# Patient Record
Sex: MALE | Race: WHITE | NOT HISPANIC OR LATINO | Employment: FULL TIME | URBAN - NONMETROPOLITAN AREA
[De-identification: names, ages, dates, MRNs, and addresses within clinical notes are randomized per-mention and may not be internally consistent; named-entity substitution may affect disease eponyms.]

---

## 2017-06-20 ENCOUNTER — HOSPITAL ENCOUNTER (EMERGENCY)
Facility: HOSPITAL | Age: 37
Discharge: HOME | End: 2017-06-20
Attending: EMERGENCY MEDICINE | Admitting: EMERGENCY MEDICINE
Payer: COMMERCIAL

## 2017-06-20 VITALS
WEIGHT: 160 LBS | HEART RATE: 90 BPM | RESPIRATION RATE: 18 BRPM | DIASTOLIC BLOOD PRESSURE: 93 MMHG | TEMPERATURE: 98.7 F | BODY MASS INDEX: 26.63 KG/M2 | SYSTOLIC BLOOD PRESSURE: 136 MMHG
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
## 2017-06-20 PROCEDURE — 99283 EMERGENCY DEPT VISIT LOW MDM: CPT

## 2018-01-10 NOTE — RESULT NOTES
Message   Recorded as Task   Date: 09/23/2016 10:58 AM, Created By: Benita Martinez   Task Name: Call Back   Assigned To: Saira Damian   Regarding Patient: Marzena Arita, Status: In Progress   Comment:    Saira Damian - 23 Sep 2016 10:58 AM     TASK CREATED  Pt called with great amount of pain that has returned, the only avail date before your week out of the office, is 09 27 2016 early am, after your full and so is Syed  Jerryl Rm Jerryl Rm Jerryl Rm I understand that is a consult slot, I wanted to ask you if I can sched pt there  Jerryl Rm Jerryl Rm Jerryl Rm Pt seen Gutierrez Nash for f/u from last proc and was informed if pain return, will need a f/u to discuss repeat inj    Jerryl Rm Jerryl Rm Thanks   Kristen Pichardo - 23 Sep 2016 11:19 AM     TASK REPLIED TO: Previously Assigned To Kristen Pichardo                      he can see me at 11:30 on Tuesday 9/27    thanks [place]! Saira Damian - 23 Sep 2016 2:05 PM     TASK IN PROGRESS   Saira Damian - 23 Sep 2016 2:06 PM     TASK EDITED  Called pt sched f/u on 09 27 2016 @ 11;15am for 11:30am ok'd JE          Signatures   Electronically signed by : Sagar Hodge, ; Sep 23 2016  2:06PM EST                       (Author)

## 2018-01-11 NOTE — RESULT NOTES
Message   Recorded as Task   Date: 06/22/2016 03:48 PM, Created By: Ruddy Figueredo   Task Name: Follow Up   Assigned To: [id number] [place] [address] end procedure,Team   Regarding Patient: Nohemy Biggs, Status: Active   CommentLucia Sayyoli - 22 Jun 2016 3:48 PM     TASK CREATED  S/P LT L5 & LT S1 TFESI on 6/15/16  Mildred Post - 22 Jun 2016 4:29 PM     TASK REASSIGNED: Previously Assigned To [address] [place] [address] - 23 Jun 2016 4:29 PM     TASK EDITED  1st attempt to call, no answer  LMOM for Sandor Arevalo - 27 Jun 2016 11:55 AM     TASK EDITED  Pt  is S/P LT L5 AND LT S1 TFESI on 6/15/16 w/ Dr Oseas Moran  Pt  reports 90% relief post inj  with a greatly improved ability to perform ADL's  No S/S of infection  Pt  has a F/U with Yvonne Dudley on 6/29/16     Sandeep Farley - 28 Jun 2016 8:46 AM     TASK REPLIED TO: Previously Assigned To Sandeep Farley  aware agree        Signatures   Electronically signed by : Jaleesa Duron, ; Jun 28 2016 10:33AM EST                       (Author)

## 2018-01-11 NOTE — PROGRESS NOTES
Assessment    1  Encounter for preventive health examination (V70 0) (Z00 00)   2  Lumbar radiculopathy (724 4) (M54 16)   3  Back pain, unspecified back pain laterality, unspecified chronicity, unspecified location   (724 5) (M54 9)    Plan  Back pain, unspecified back pain laterality, unspecified chronicity, unspecified location    · Oxycodone-Acetaminophen 5-325 MG Oral Tablet; TAKE 1 TABLET EVERY 4 TO 6  HOURS AS NEEDED FOR PAIN  Back pain, unspecified back pain laterality, unspecified chronicity, unspecified location,  Disc degeneration, lumbar, Lumbar radiculopathy    · PredniSONE 20 MG Oral Tablet; one po tid x 3 days, bid x 3 days, qd x 3 days  Health Maintenance    · * MRI LUMBAR SPINE WO CONTRAST; Status:Need Information - Financial  Authorization; Requested for:05Apr2016;     Discussion/Summary  Impression: health maintenance visit  Currently, he eats a healthy diet and eats an adequate diet  Prostate cancer screening: PSA is not indicated  Testicular cancer screening: the risks and benefits of testicular cancer screening were discussed, self testicular exam technique was taught and monthly self testicular exam was advised  Colorectal cancer screening: colorectal cancer screening is not indicated  The risks and benefits of immunizations were discussed and immunizations are up to date  Advice and education were given regarding nutrition, aerobic exercise, weight bearing exercise, weight loss, calcium supplements, vitamin D supplements, reproductive health, cardiovascular risk reduction, tobacco cessation, alcohol use, sunscreen use, self skin examination, helmet use, seat belt use, fall risk reduction and advanced directive planning  Patient discussion: discussed with the patient  History of Present Illness  HM, Adult Male: The patient is being seen for a health maintenance evaluation  General Health: The patient's health since the last visit is described as good  He has regular dental visits   He denies vision problems  He denies hearing loss  Immunizations status: up to date  Lifestyle:  He consumes a diverse and healthy diet  He does not have any weight concerns  He exercises regularly  He does not use tobacco  He denies alcohol use  He denies drug use  Screening: cancer screening reviewed and current  metabolic screening reviewed and current  risk screening reviewed and current  HPI: Patient has left lower back pain with radiation into the left left and buttock  He was seen in the ED on 3/27 and given Valium and Toradol with minimal improvement  The patient awoke approximately three days prior to the ED visit with severe left lower back pain and radiation into the left leg  HIs symptoms worsened over the next few days prompting his visit to the ED  Lumbar spine x-ray was negative in the ED  The patient notes lower back pain with radiation into the leg and weakness of the left leg  He describes the leg as numb  Review of Systems    Constitutional: No fever or chills, feels well, no tiredness, no recent weight gain or weight loss  Eyes: No complaints of eye pain, no red eyes, no discharge from eyes, no itchy eyes  ENT: no complaints of earache, no hearing loss, no nosebleeds, no nasal discharge, no sore throat, no hoarseness  Cardiovascular: No complaints of slow heart rate, no fast heart rate, no chest pain, no palpitations, no leg claudication, no lower extremity  Respiratory: No complaints of shortness of breath, no wheezing, no cough, no SOB on exertion, no orthopnea or PND  Gastrointestinal: No complaints of abdominal pain, no constipation, no nausea or vomiting, no diarrhea or bloody stools  Genitourinary: No complaints of dysuria, no incontinence, no hesitancy, no nocturia, no genital lesion, no testicular pain  Musculoskeletal: as noted in HPI  Integumentary: No complaints of skin rash or skin lesions, no itching, no skin wound, no dry skin     Neurological: No compliants of headache, no confusion, no convulsions, no numbness or tingling, no dizziness or fainting, no limb weakness, no difficulty walking  Psychiatric: Is not suicidal, no sleep disturbances, no anxiety or depression, no change in personality, no emotional problems  Endocrine: No complaints of proptosis, no hot flashes, no muscle weakness, no erectile dysfunction, no deepening of the voice, no feelings of weakness  Hematologic/Lymphatic: No complaints of swollen glands, no swollen glands in the neck, does not bleed easily, no easy bruising  Active Problems    1  Back pain (724 5) (M54 9)   2  Current every day smoker (305 1) (F17 200)   3  Disc degeneration, lumbar (722 52) (M51 36)   4  Headache (784 0) (R51)    Surgical History    · History of Back Surgery   · History of Hand Surgery    Family History    · Family history of Cancer (199 1) (C80 1)    · Family history of Diabetes (250 00) (E11 9)    Social History    · Current every day smoker (305 1) (F17 200)   · Social alcohol use (Z78 9)    Current Meds   1  No Reported Medications Recorded    Allergies    1  Strawberry    Vitals   Recorded: [id number] 08:02AM Recorded: [id number] 08:00AM   Heart Rate 92    Respiration 16    Systolic  817   Diastolic  84   Height  5 ft 5 in   Weight  169 lb 8 0 oz   BMI Calculated  28 21   BSA Calculated  1 85     Physical Exam    Constitutional   General appearance: No acute distress, well appearing and well nourished  Eyes   Conjunctiva and lids: No swelling, erythema, or discharge  Pupils and irises: Equal, round and reactive to light  Ears, Nose, Mouth, and Throat   External inspection of ears and nose: Normal     Otoscopic examination: Tympanic membrance translucent with normal light reflex  Canals patent without erythema  Oropharynx: Normal with no erythema, edema, exudate or lesions  Pulmonary   Respiratory effort: No increased work of breathing or signs of respiratory distress  Auscultation of lungs: Clear to auscultation  Cardiovascular   Auscultation of heart: Normal rate and rhythm, normal S1 and S2, without murmurs  Examination of extremities for edema and/or varicosities: Normal     Abdomen   Abdomen: Non-tender, no masses  Lymphatic   Palpation of lymph nodes in neck: No lymphadenopathy  Musculoskeletal   Gait and station: Normal     Digits and nails: Normal without clubbing or cyanosis  Inspection/palpation of joints, bones, and muscles: Abnormal   left lower back tenderness with paraspinal tenderness, incision from previous laminectomy noted, + straight leg raise  Skin   Skin and subcutaneous tissue: Normal without rashes or lesions  Neurologic   Cranial nerves: Cranial nerves 2-12 intact  Reflexes: Abnormal   Deep tendon reflexes: 3+ right patella, 1+ left patella, 3+ right ankle jerk and 1+ left ankle jerk  Sensation: No sensory loss  Psychiatric   Orientation to person, place and time: Normal     Mood and affect: Normal        Procedure    Procedure: Hearing Acuity Test    Audiometry:   The patient Tolerated the procedure well  There were no complications  Procedure: Visual Acuity Test    Results: normal in both eyes  The patient tolerated the procedure well  There were no complications  Signatures   Electronically signed by : TEMO Elliott; Apr 5 2016  8:24AM EST                       (Author)    Electronically signed by : Zachery Milton DO;  Apr 5 2016  9:17AM EST                       (Author)

## 2018-01-12 NOTE — RESULT NOTES
Message   Discussed results with patient  Will refer to neurosurgery to discuss discectomy  Will add NSAID  Verified Results  * MRI LUMBAR SPINE WO CONTRAST [id number] 03:08PM Jimbo Cheema Order Number: [id number]     Test Name Result Flag Reference   MRI LUMBAR SPINE WO CONTRAST (Report)     MRI LUMBAR SPINE WITHOUT CONTRAST     INDICATION: Low back pain radiating down left leg, left leg numbness, 3 weeks     COMPARISON: X-ray 3/27/2016     TECHNIQUE: Sagittal T1, sagittal T2, sagittal inversion recovery, axial T1 and axial T2, coronal T2       IMAGE QUALITY: Diagnostic     FINDINGS:     ALIGNMENT: Left convex L3-4 apex scoliosis  Minor straightening of normal lumbar lordosis  Minor degree of congenital canal stenosis  MARROW SIGNAL: Normal marrow signal is identified within the visualized bony structures  No discrete marrow lesion  DISTAL CORD AND CONUS: Normal size and signal within the distal cord and conus  The conus ends at the L1 level  PARASPINAL SOFT TISSUES: Paraspinal soft tissues are unremarkable  SACRUM: Normal signal within the sacrum  No evidence of insufficiency or stress fracture  LOWER THORACIC DISC SPACES: Normal disc height and signal  No disc herniation, canal stenosis or foraminal narrowing  LUMBAR DISC SPACES:        L1-L2: Normal      L2-L3: Normal      L3-L4: Normal      L4-L5: Slight reduction disc height, thin broad-based posterior protrusion  Narrowing of the left lateral recess, correlate for left L5 radiculitis  L5-S1: Broad-based central disc protrusion in contact with both S1 roots  Left S1 root appears slightly displaced  Correlate for left S1 radiculitis  IMPRESSION:     Narrowing of the left lateral recess L4-L5, correlate for left L5 radiculitis  Broad-based central protrusion L5-S1, displacing left S1 root  Correlate for left S1 radiculitis  Workstation performed: [id number]     Signed by:    Swati Ceron MD   4/12/16 Plan  Back pain, unspecified back pain laterality, unspecified chronicity, unspecified location    · Oxycodone-Acetaminophen 5-325 MG Oral Tablet; TAKE 1 TABLET EVERY 4 TO  6 HOURS AS NEEDED FOR PAIN  Lumbar disc herniation with radiculopathy, Lumbar radiculopathy    · 1 - Giacomo Carrillo MD  (Neurosurgery) Physician Referral  Consult  Status: Active   Requested for: 12Apr2016  Care Summary provided  : Yes

## 2018-01-15 NOTE — RESULT NOTES
Message   Recorded as Task   Date: 06/08/2016 09:01 AM, Created By: Barbara Zuniga   Task Name: Follow Up   Assigned To: [id number] end procedure,Team   Regarding Patient: Marilu Huitron, Status: In Progress   KelsiOdalys Ventura - 08 Jun 2016 9:01 AM     TASK CREATED  Pt  is S/P LT L5 & LT S1 TFESI performed on 6/01/16 by Dr Lauryn Rangel  Pt  is scheduled for F/U on 6/29/16 with Sandor Jurado - 08 Jun 2016 9:02 AM     TASK EDITED  1st attempt to call, no answer  Left message for C/B  Mildred Post - 09 Jun 2016 1:42 PM     TASK EDITED    Pt LM on [place] procedure line 6/8/16a t 1534, returning our call, will be home till 10 pm, till he goes into work for night shift  *************   Sandor Mckeon - 16 Jun 2016 10:48 AM     TASK EDITED  Pt  reports 50% improvement post inj  and an increase in ability to perform ADL's  Pt  reports no sS/S of infection  Pt has F/U on 6/29/16 with Sun Zuniga - 16 Jun 2016 10:48 AM     TASK EDITED   Nishant Rodriguez - 16 Jun 2016 11:32 AM     TASK REPLIED TO: Previously Assigned To Erik Coker Mary - 16 Jun 2016 4:01 PM     TASK IN PROGRESS        Signatures   Electronically signed by :  Chinyere Mao, ; Jun 16 2016  4:01PM EST                       (Author)

## 2018-04-11 ENCOUNTER — TELEPHONE (OUTPATIENT)
Dept: PAIN MEDICINE | Facility: CLINIC | Age: 38
End: 2018-04-11

## 2018-06-24 ENCOUNTER — HOSPITAL ENCOUNTER (EMERGENCY)
Facility: HOSPITAL | Age: 38
Discharge: HOME | End: 2018-06-24
Attending: EMERGENCY MEDICINE | Admitting: EMERGENCY MEDICINE
Payer: COMMERCIAL

## 2018-06-24 ENCOUNTER — APPOINTMENT (EMERGENCY)
Dept: CT IMAGING | Facility: HOSPITAL | Age: 38
End: 2018-06-24
Payer: COMMERCIAL

## 2018-06-24 VITALS
BODY MASS INDEX: 29.62 KG/M2 | SYSTOLIC BLOOD PRESSURE: 120 MMHG | HEART RATE: 77 BPM | DIASTOLIC BLOOD PRESSURE: 75 MMHG | RESPIRATION RATE: 20 BRPM | OXYGEN SATURATION: 97 % | WEIGHT: 178 LBS | TEMPERATURE: 98.4 F

## 2018-06-24 DIAGNOSIS — J20.9 ACUTE BRONCHITIS: Primary | ICD-10-CM

## 2018-06-24 LAB
ALBUMIN SERPL BCP-MCNC: 3.7 G/DL (ref 3.5–5)
ALP SERPL-CCNC: 80 U/L (ref 46–116)
ALT SERPL W P-5'-P-CCNC: 47 U/L (ref 12–78)
ANION GAP SERPL CALCULATED.3IONS-SCNC: 13 MMOL/L (ref 4–13)
AST SERPL W P-5'-P-CCNC: 24 U/L (ref 5–45)
BASOPHILS # BLD MANUAL: 0 THOUSAND/UL (ref 0–0.1)
BASOPHILS NFR MAR MANUAL: 0 % (ref 0–1)
BILIRUB SERPL-MCNC: 0.2 MG/DL (ref 0.2–1)
BUN SERPL-MCNC: 13 MG/DL (ref 5–25)
CALCIUM SERPL-MCNC: 9 MG/DL (ref 8.3–10.1)
CHLORIDE SERPL-SCNC: 104 MMOL/L (ref 100–108)
CO2 SERPL-SCNC: 23 MMOL/L (ref 21–32)
CREAT SERPL-MCNC: 1.06 MG/DL (ref 0.6–1.3)
EOSINOPHIL # BLD MANUAL: 0 THOUSAND/UL (ref 0–0.4)
EOSINOPHIL NFR BLD MANUAL: 0 % (ref 0–6)
ERYTHROCYTE [DISTWIDTH] IN BLOOD BY AUTOMATED COUNT: 14.6 % (ref 11.6–15.1)
GFR SERPL CREATININE-BSD FRML MDRD: 90 ML/MIN/1.73SQ M
GLUCOSE SERPL-MCNC: 94 MG/DL (ref 65–140)
HCT VFR BLD AUTO: 43.1 % (ref 36.5–49.3)
HGB BLD-MCNC: 14.8 G/DL (ref 12–17)
LYMPHOCYTES # BLD AUTO: 34 % (ref 14–44)
LYMPHOCYTES # BLD AUTO: 4.48 THOUSAND/UL (ref 0.6–4.47)
MCH RBC QN AUTO: 28.2 PG (ref 26.8–34.3)
MCHC RBC AUTO-ENTMCNC: 34.3 G/DL (ref 31.4–37.4)
MCV RBC AUTO: 82 FL (ref 82–98)
MONOCYTES # BLD AUTO: 1.45 THOUSAND/UL (ref 0–1.22)
MONOCYTES NFR BLD: 11 % (ref 4–12)
NEUTROPHILS # BLD MANUAL: 6.72 THOUSAND/UL (ref 1.85–7.62)
NEUTS BAND NFR BLD MANUAL: 3 % (ref 0–8)
NEUTS SEG NFR BLD AUTO: 48 % (ref 43–75)
NT-PROBNP SERPL-MCNC: 27 PG/ML
PLATELET # BLD AUTO: 253 THOUSANDS/UL (ref 149–390)
PLATELET BLD QL SMEAR: ADEQUATE
PMV BLD AUTO: 10 FL (ref 8.9–12.7)
POTASSIUM SERPL-SCNC: 3.1 MMOL/L (ref 3.5–5.3)
PROT SERPL-MCNC: 7.2 G/DL (ref 6.4–8.2)
RBC # BLD AUTO: 5.25 MILLION/UL (ref 3.88–5.62)
SODIUM SERPL-SCNC: 140 MMOL/L (ref 136–145)
TOTAL CELLS COUNTED SPEC: 100
TROPONIN I SERPL-MCNC: <0.02 NG/ML
VARIANT LYMPHS # BLD AUTO: 4 %
WBC # BLD AUTO: 13.17 THOUSAND/UL (ref 4.31–10.16)

## 2018-06-24 PROCEDURE — 71275 CT ANGIOGRAPHY CHEST: CPT

## 2018-06-24 PROCEDURE — 36415 COLL VENOUS BLD VENIPUNCTURE: CPT | Performed by: EMERGENCY MEDICINE

## 2018-06-24 PROCEDURE — 83880 ASSAY OF NATRIURETIC PEPTIDE: CPT | Performed by: EMERGENCY MEDICINE

## 2018-06-24 PROCEDURE — 85027 COMPLETE CBC AUTOMATED: CPT | Performed by: EMERGENCY MEDICINE

## 2018-06-24 PROCEDURE — 80053 COMPREHEN METABOLIC PANEL: CPT | Performed by: EMERGENCY MEDICINE

## 2018-06-24 PROCEDURE — 93005 ELECTROCARDIOGRAM TRACING: CPT

## 2018-06-24 PROCEDURE — 85007 BL SMEAR W/DIFF WBC COUNT: CPT | Performed by: EMERGENCY MEDICINE

## 2018-06-24 PROCEDURE — 94640 AIRWAY INHALATION TREATMENT: CPT

## 2018-06-24 PROCEDURE — 99285 EMERGENCY DEPT VISIT HI MDM: CPT

## 2018-06-24 PROCEDURE — 84484 ASSAY OF TROPONIN QUANT: CPT | Performed by: EMERGENCY MEDICINE

## 2018-06-24 RX ORDER — BENZONATATE 200 MG/1
200 CAPSULE ORAL 3 TIMES DAILY PRN
Qty: 20 CAPSULE | Refills: 0 | Status: SHIPPED | OUTPATIENT
Start: 2018-06-24 | End: 2019-04-26

## 2018-06-24 RX ORDER — POTASSIUM CHLORIDE 20 MEQ/1
40 TABLET, EXTENDED RELEASE ORAL ONCE
Status: COMPLETED | OUTPATIENT
Start: 2018-06-24 | End: 2018-06-24

## 2018-06-24 RX ORDER — ALBUTEROL SULFATE 90 UG/1
2 AEROSOL, METERED RESPIRATORY (INHALATION) EVERY 6 HOURS PRN
Qty: 1 INHALER | Refills: 0 | Status: SHIPPED | OUTPATIENT
Start: 2018-06-24 | End: 2019-04-26

## 2018-06-24 RX ORDER — AZITHROMYCIN 250 MG/1
250 TABLET, FILM COATED ORAL DAILY
Qty: 6 TABLET | Refills: 0 | Status: SHIPPED | OUTPATIENT
Start: 2018-06-24 | End: 2018-06-29

## 2018-06-24 RX ORDER — IPRATROPIUM BROMIDE AND ALBUTEROL SULFATE 2.5; .5 MG/3ML; MG/3ML
3 SOLUTION RESPIRATORY (INHALATION) ONCE
Status: COMPLETED | OUTPATIENT
Start: 2018-06-24 | End: 2018-06-24

## 2018-06-24 RX ADMIN — IOHEXOL 85 ML: 350 INJECTION, SOLUTION INTRAVENOUS at 01:55

## 2018-06-24 RX ADMIN — POTASSIUM CHLORIDE 40 MEQ: 1500 TABLET, EXTENDED RELEASE ORAL at 02:08

## 2018-06-24 RX ADMIN — IPRATROPIUM BROMIDE AND ALBUTEROL SULFATE 3 ML: .5; 3 SOLUTION RESPIRATORY (INHALATION) at 01:02

## 2018-06-24 NOTE — ED PROVIDER NOTES
History  Chief Complaint   Patient presents with    Shortness of Breath     Pt w/hx of cough productive for yellow sputum X 1 week c/o increased SOB tonight with chest pain - denies fever/chills     15-year-old male  He has been sick with a cough for about a week  It is productive of yellow sputum  No rhinorrhea or congestion  No sore throat  No fever or chills  He is a smoker  Around noon today developed chest heaviness and shortness of breath  It became worse tonight knee presents for evaluation  He has no cardiac history  His cardiac risk factors include family history and smoking  He there is no hypertension, hypercholesterolemia or diabetes  There is no unilateral leg swelling or hemoptysis  There is no pleuritic pain  No peripheral edema  Patient has no history of pulmonary disease  Symptoms are currently moderately severe without relieving factors  None       History reviewed  No pertinent past medical history  Past Surgical History:   Procedure Laterality Date    BACK SURGERY      herniated disk removal    FINGER AMPUTATION Right     reattachment of right thumb    FINGER SURGERY Right     thumb reattached        History reviewed  No pertinent family history  I have reviewed and agree with the history as documented  Social History   Substance Use Topics    Smoking status: Current Every Day Smoker    Smokeless tobacco: Never Used    Alcohol use Yes      Comment: rarely        Review of Systems   Constitutional: Negative for chills and fever  HENT: Negative for rhinorrhea and sore throat  Eyes: Negative for pain, redness and visual disturbance  Respiratory: Positive for cough, chest tightness and shortness of breath  Cardiovascular: Positive for chest pain  Negative for leg swelling  Gastrointestinal: Negative for abdominal pain, diarrhea and vomiting  Endocrine: Negative for polydipsia and polyuria     Genitourinary: Negative for dysuria, frequency and hematuria  Musculoskeletal: Negative for back pain and neck pain  Skin: Negative for rash and wound  Allergic/Immunologic: Negative for immunocompromised state  Neurological: Negative for weakness, numbness and headaches  Psychiatric/Behavioral: Negative for hallucinations and suicidal ideas  All other systems reviewed and are negative  Physical Exam  Physical Exam   Constitutional: He is oriented to person, place, and time  He appears well-developed and well-nourished  No distress  HENT:   Head: Normocephalic and atraumatic  Mouth/Throat: Oropharynx is clear and moist    Eyes: Conjunctivae are normal  Right eye exhibits no discharge  Left eye exhibits no discharge  No scleral icterus  Neck: Normal range of motion  Neck supple  No JVD present  Cardiovascular: Normal rate, regular rhythm, normal heart sounds and intact distal pulses  Exam reveals no gallop and no friction rub  No murmur heard  Pulmonary/Chest: Breath sounds normal  He is in respiratory distress  He has no wheezes  He has no rales  Patient is moderately dyspneic  Abdominal: Soft  Bowel sounds are normal  He exhibits no distension  There is no tenderness  There is no rebound and no guarding  Musculoskeletal: Normal range of motion  He exhibits no edema, tenderness or deformity  No CVA tenderness  No calf pain  Neurological: He is alert and oriented to person, place, and time  He has normal strength  No sensory deficit  GCS eye subscore is 4  GCS verbal subscore is 5  GCS motor subscore is 6  Skin: Skin is warm and dry  No rash noted  He is not diaphoretic  Psychiatric: He has a normal mood and affect  His behavior is normal    Vitals reviewed        Vital Signs  ED Triage Vitals [06/24/18 0058]   Temperature Pulse Respirations Blood Pressure SpO2   98 4 °F (36 9 °C) 90 22 135/73 96 %      Temp Source Heart Rate Source Patient Position - Orthostatic VS BP Location FiO2 (%)   Temporal Monitor Lying Left arm -- Pain Score       No Pain           Vitals:    06/24/18 0058 06/24/18 0245 06/24/18 0330   BP: 135/73 130/71 120/75   Pulse: 90 66 77   Patient Position - Orthostatic VS: Lying         Visual Acuity      ED Medications  Medications   ipratropium-albuterol (DUO-NEB) 0 5-2 5 mg/3 mL inhalation solution 3 mL (3 mL Nebulization Given 6/24/18 0102)   iohexol (OMNIPAQUE) 350 MG/ML injection (SINGLE-DOSE) 85 mL (85 mL Intravenous Given 6/24/18 0155)   potassium chloride (K-DUR,KLOR-CON) CR tablet 40 mEq (40 mEq Oral Given 6/24/18 0208)       Diagnostic Studies  Results Reviewed     Procedure Component Value Units Date/Time    [facility] draw [[id number]] Collected:  06/24/18 0101    Lab Status:  Final result Specimen:  Blood Updated:  06/24/18 0301    Narrative: The following orders were created for panel order [facility] draw  Procedure                               Abnormality         Status                     ---------                               -----------         ------                     Josephus Primrose Top on VBTJ[[id number]]                            Final result               Gold top on [id number][[id number]]                                  Final result                 Please view results for these tests on the individual orders  CBC and differential [[id number]]  (Abnormal) Collected:  06/24/18 0101    Lab Status:  Final result Specimen:  Blood from Arm, Right Updated:  06/24/18 0141     WBC 13 17 (H) Thousand/uL      RBC 5 25 Million/uL      Hemoglobin 14 8 g/dL      Hematocrit 43 1 %      MCV 82 fL      MCH 28 2 pg      MCHC 34 3 g/dL      RDW 14 6 %      MPV 10 0 fL      Platelets 660 Thousands/uL     Narrative: This is an appended report  These results have been appended to a previously verified report      Comprehensive metabolic panel [[id number]]  (Abnormal) Collected:  06/24/18 0101    Lab Status:  Final result Specimen:  Blood from Arm, Right Updated:  06/24/18 0131     Sodium 140 mmol/L      Potassium 3 1 (L) mmol/L      Chloride 104 mmol/L      CO2 23 mmol/L      Anion Gap 13 mmol/L      BUN 13 mg/dL      Creatinine 1 06 mg/dL      Glucose 94 mg/dL      Calcium 9 0 mg/dL      AST 24 U/L      ALT 47 U/L      Alkaline Phosphatase 80 U/L      Total Protein 7 2 g/dL      Albumin 3 7 g/dL      Total Bilirubin 0 20 mg/dL      eGFR 90 ml/min/1 73sq m     Narrative:         National Kidney Disease Education Program recommendations are as follows:  GFR calculation is accurate only with a steady state creatinine  Chronic Kidney disease less than 60 ml/min/1 73 sq  meters  Kidney failure less than 15 ml/min/1 73 sq  meters  B-type natriuretic peptide [[id number]]  (Normal) Collected:  06/24/18 0101    Lab Status:  Final result Specimen:  Blood from Arm, Right Updated:  06/24/18 0131     NT-proBNP 27 pg/mL     Troponin I [[id number]]  (Normal) Collected:  06/24/18 0101    Lab Status:  Final result Specimen:  Blood from Arm, Right Updated:  06/24/18 0128     Troponin I <0 02 ng/mL                  CTA ED chest PE study   Final Result by Rishabh Barkley DO (06/24 8884)   No CT evidence of pulmonary embolism        Findings are consistent with the preliminary report from [facility] which was provided shortly after completion of the exam          Workstation performed: [id number]                    Procedures  ECG 12 Lead Documentation  Date/Time: 6/24/2018 1:13 AM  Performed by: Sadia Lynn by: Farhat Shankar     ECG reviewed by me, the ED Provider: yes    Patient location:  ED  Interpretation:     Interpretation: normal    Rate:     ECG rate assessment: normal    Rhythm:     Rhythm: sinus rhythm    Ectopy:     Ectopy: none    QRS:     QRS axis:  Normal  Conduction:     Conduction: normal    ST segments:     ST segments:  Normal  T waves:     T waves: normal             Phone Contacts  ED Phone Contact    ED Course                               MDM  Number of Diagnoses or Management Options  Diagnosis management comments: EKG was normal   Troponin was negative despite having much greater than 3 hours of chest discomfort  I do not feel this is acute coronary syndrome  BNP was normal  This is not congestive heart failure  No pneumothorax or pneumonia on imaging  Patient did undergo chest CT for pulmonary embolism  This too was negative  Most likely patient has bronchitis  I suspect there is some anxiety also contributing to his dyspnea  Oxygen saturations are good  Patient felt better after neb in the emergency room  Appropriate for discharge and outpatient management  Amount and/or Complexity of Data Reviewed  Clinical lab tests: ordered and reviewed  Tests in the radiology section of CPT®: ordered and reviewed  Independent visualization of images, tracings, or specimens: yes      CritCare Time    Disposition  Final diagnoses:   Acute bronchitis     Time reflects when diagnosis was documented in both MDM as applicable and the Disposition within this note     Time User Action Codes Description Comment    6/24/2018  3:39 AM Brittany Spivey [J20 9] Acute bronchitis       ED Disposition     ED Disposition Condition Comment    Discharge  Karen Leblanc discharge to home/self care      Condition at discharge: Good        Follow-up Information     Follow up With Specialties Details Why Contact Info    Hunter Bowman, DO Family Medicine In 1 week If not better, sooner if any problems [address] [address]  [address]  [phone]            Discharge Medication List as of 6/24/2018  3:40 AM      START taking these medications    Details   albuterol (PROVENTIL HFA,VENTOLIN HFA) 90 mcg/act inhaler Inhale 2 puffs every 6 (six) hours as needed for wheezing or shortness of breath, Starting Sun 6/24/2018, Print      azithromycin (ZITHROMAX) 250 mg tablet Take 1 tablet (250 mg total) by mouth daily for 5 days Take first 2 tablets together, then 1 every day until finished , Starting Aurelia Harrison 6/24/2018, Until Fri 6/29/2018, Print      benzonatate (TESSALON) 200 MG capsule Take 1 capsule (200 mg total) by mouth 3 (three) times a day as needed for cough, Starting Sun 6/24/2018, Print           No discharge procedures on file      ED Provider  Electronically Signed by           Tova Millan MD  06/25/18 9994

## 2018-06-24 NOTE — ED NOTES
Patient transported to [facility], [address] [facility]  06/24/18 4866

## 2018-06-24 NOTE — DISCHARGE INSTRUCTIONS
Acute Bronchitis   WHAT YOU NEED TO KNOW:   Acute bronchitis is swelling and irritation in the air passages of your lungs  This irritation may cause you to cough or have other breathing problems  Acute bronchitis often starts because of another illness, such as a cold or the flu  The illness spreads from your nose and throat to your windpipe and airways  Bronchitis is often called a chest cold  Acute bronchitis lasts about 3 to 6 weeks and is usually not a serious illness  Your cough can last for several weeks  DISCHARGE INSTRUCTIONS:   Return to the emergency department if:   · You cough up blood  · Your lips or fingernails turn blue  · You feel like you are not getting enough air when you breathe  Contact your healthcare provider if:   · You have a fever  · Your breathing problems do not go away or get worse  · Your cough does not get better within 4 weeks  · You have questions or concerns about your condition or care  Self-care:   · Get more rest   Rest helps your body to heal  Slowly start to do more each day  Rest when you feel it is needed  · Avoid irritants in the air  Avoid chemicals, fumes, and dust  Wear a face mask if you must work around dust or fumes  Stay inside on days when air pollution levels are high  If you have allergies, stay inside when pollen counts are high  Do not use aerosol products, such as spray-on deodorant, bug spray, and hair spray  · Do not smoke or be around others who smoke  Nicotine and other chemicals in cigarettes and cigars damages the cilia that move mucus out of your lungs  Ask your healthcare provider for information if you currently smoke and need help to quit  E-cigarettes or smokeless tobacco still contain nicotine  Talk to your healthcare provider before you use these products  · Drink liquids as directed  Liquids help keep your air passages moist and help you cough up mucus   You may need to drink more liquids when you have acute bronchitis  Ask how much liquid to drink each day and which liquids are best for you  · Use a humidifier or vaporizer  Use a cool mist humidifier or a vaporizer to increase air moisture in your home  This may make it easier for you to breathe and help decrease your cough  Decrease risk for acute bronchitis:   · Get the vaccinations you need  Ask your healthcare provider if you should get vaccinated against the flu or pneumonia  · Prevent the spread of germs  You can decrease your risk of acute bronchitis and other illnesses by doing the following:     [facility] your hands often with soap and water  Carry germ-killing hand lotion or gel with you  You can use the lotion or gel to clean your hands when soap and water are not available  ¨ Do not touch your eyes, nose, or mouth unless you have washed your hands first     ¨ Always cover your mouth when you cough to prevent the spread of germs  It is best to cough into a tissue or your shirt sleeve instead of into your hand  Ask those around you cover their mouths when they cough  ¨ Try to avoid people who have a cold or the flu  If you are sick, stay away from others as much as possible  Medicines: Your healthcare provider may  give you any of the following:  · Ibuprofen or acetaminophen  are medicines that help lower your fever  They are available without a doctor's order  Ask your healthcare provider which medicine is right for you  Ask how much to take and how often to take it  Follow directions  These medicines can cause stomach bleeding if not taken correctly  Ibuprofen can cause kidney damage  Do not take ibuprofen if you have kidney disease, an ulcer, or allergies to aspirin  Acetaminophen can cause liver damage  Do not take more than 4,000 milligrams in 24 hours  · Decongestants  help loosen mucus in your lungs and make it easier to cough up  This can help you breathe easier  · Cough suppressants  decrease your urge to cough   If your cough produces mucus, do not take a cough suppressant unless your healthcare provider tells you to  Your healthcare provider may suggest that you take a cough suppressant at night so you can rest     · Inhalers  may be given  Your healthcare provider may give you one or more inhalers to help you breathe easier and cough less  An inhaler gives your medicine to open your airways  Ask your healthcare provider to show you how to use your inhaler correctly  · Take your medicine as directed  Contact your healthcare provider if you think your medicine is not helping or if you have side effects  Tell him of her if you are allergic to any medicine  Keep a list of the medicines, vitamins, and herbs you take  Include the amounts, and when and why you take them  Bring the list or the pill bottles to follow-up visits  Carry your medicine list with you in case of an emergency  Follow up with your healthcare provider as directed:  Write down questions you have so you will remember to ask them during your follow-up visits  © 2017 [facility] Information is for End User's use only and may not be sold, redistributed or otherwise used for commercial purposes  All illustrations and images included in CareNotes® are the copyrighted property of A D A M , Inc  or Brent Gamboa  The above information is an  only  It is not intended as medical advice for individual conditions or treatments  Talk to your doctor, nurse or pharmacist before following any medical regimen to see if it is safe and effective for you

## 2018-06-25 LAB
ATRIAL RATE: 94 BPM
P AXIS: 53 DEGREES
PR INTERVAL: 152 MS
QRS AXIS: 24 DEGREES
QRSD INTERVAL: 84 MS
QT INTERVAL: 370 MS
QTC INTERVAL: 462 MS
T WAVE AXIS: 41 DEGREES
VENTRICULAR RATE: 94 BPM

## 2018-06-25 PROCEDURE — 93010 ELECTROCARDIOGRAM REPORT: CPT | Performed by: INTERNAL MEDICINE

## 2019-04-26 ENCOUNTER — APPOINTMENT (OUTPATIENT)
Dept: LAB | Facility: HOSPITAL | Age: 39
End: 2019-04-26
Payer: COMMERCIAL

## 2019-04-26 ENCOUNTER — OFFICE VISIT (OUTPATIENT)
Dept: FAMILY MEDICINE CLINIC | Facility: CLINIC | Age: 39
End: 2019-04-26
Payer: COMMERCIAL

## 2019-04-26 ENCOUNTER — HOSPITAL ENCOUNTER (OUTPATIENT)
Dept: CT IMAGING | Facility: HOSPITAL | Age: 39
Discharge: HOME | End: 2019-04-26
Payer: COMMERCIAL

## 2019-04-26 VITALS
DIASTOLIC BLOOD PRESSURE: 86 MMHG | TEMPERATURE: 98.8 F | WEIGHT: 171.8 LBS | HEIGHT: 65 IN | SYSTOLIC BLOOD PRESSURE: 130 MMHG | BODY MASS INDEX: 28.62 KG/M2

## 2019-04-26 DIAGNOSIS — R10.32 ABDOMINAL PAIN, LLQ: Primary | ICD-10-CM

## 2019-04-26 DIAGNOSIS — R10.32 ABDOMINAL PAIN, LLQ: ICD-10-CM

## 2019-04-26 DIAGNOSIS — K52.9 COLITIS: Primary | ICD-10-CM

## 2019-04-26 LAB
ALBUMIN SERPL BCP-MCNC: 4 G/DL (ref 3.5–5)
ALP SERPL-CCNC: 93 U/L (ref 46–116)
ALT SERPL W P-5'-P-CCNC: 37 U/L (ref 12–78)
ANION GAP SERPL CALCULATED.3IONS-SCNC: 11 MMOL/L (ref 4–13)
AST SERPL W P-5'-P-CCNC: 22 U/L (ref 5–45)
BASOPHILS # BLD AUTO: 0.09 THOUSANDS/ΜL (ref 0–0.1)
BASOPHILS NFR BLD AUTO: 1 % (ref 0–1)
BILIRUB SERPL-MCNC: 1 MG/DL (ref 0.2–1)
BUN SERPL-MCNC: 15 MG/DL (ref 5–25)
CALCIUM SERPL-MCNC: 9.4 MG/DL (ref 8.3–10.1)
CHLORIDE SERPL-SCNC: 101 MMOL/L (ref 100–108)
CO2 SERPL-SCNC: 26 MMOL/L (ref 21–32)
CREAT SERPL-MCNC: 1.06 MG/DL (ref 0.6–1.3)
EOSINOPHIL # BLD AUTO: 0.25 THOUSAND/ΜL (ref 0–0.61)
EOSINOPHIL NFR BLD AUTO: 2 % (ref 0–6)
ERYTHROCYTE [DISTWIDTH] IN BLOOD BY AUTOMATED COUNT: 13.8 % (ref 11.6–15.1)
GFR SERPL CREATININE-BSD FRML MDRD: 89 ML/MIN/1.73SQ M
GLUCOSE SERPL-MCNC: 103 MG/DL (ref 65–140)
HCT VFR BLD AUTO: 49.7 % (ref 36.5–49.3)
HGB BLD-MCNC: 15.9 G/DL (ref 12–17)
IMM GRANULOCYTES # BLD AUTO: 0.05 THOUSAND/UL (ref 0–0.2)
IMM GRANULOCYTES NFR BLD AUTO: 0 % (ref 0–2)
LYMPHOCYTES # BLD AUTO: 2.99 THOUSANDS/ΜL (ref 0.6–4.47)
LYMPHOCYTES NFR BLD AUTO: 19 % (ref 14–44)
MCH RBC QN AUTO: 27.3 PG (ref 26.8–34.3)
MCHC RBC AUTO-ENTMCNC: 32 G/DL (ref 31.4–37.4)
MCV RBC AUTO: 85 FL (ref 82–98)
MONOCYTES # BLD AUTO: 1.06 THOUSAND/ΜL (ref 0.17–1.22)
MONOCYTES NFR BLD AUTO: 7 % (ref 4–12)
NEUTROPHILS # BLD AUTO: 10.94 THOUSANDS/ΜL (ref 1.85–7.62)
NEUTS SEG NFR BLD AUTO: 71 % (ref 43–75)
NRBC BLD AUTO-RTO: 0 /100 WBCS
PLATELET # BLD AUTO: 245 THOUSANDS/UL (ref 149–390)
PMV BLD AUTO: 9.7 FL (ref 8.9–12.7)
POTASSIUM SERPL-SCNC: 3.8 MMOL/L (ref 3.5–5.3)
PROT SERPL-MCNC: 7.7 G/DL (ref 6.4–8.2)
RBC # BLD AUTO: 5.83 MILLION/UL (ref 3.88–5.62)
SODIUM SERPL-SCNC: 138 MMOL/L (ref 136–145)
WBC # BLD AUTO: 15.38 THOUSAND/UL (ref 4.31–10.16)

## 2019-04-26 PROCEDURE — 85025 COMPLETE CBC W/AUTO DIFF WBC: CPT

## 2019-04-26 PROCEDURE — 74177 CT ABD & PELVIS W/CONTRAST: CPT

## 2019-04-26 PROCEDURE — 80053 COMPREHEN METABOLIC PANEL: CPT

## 2019-04-26 PROCEDURE — 36415 COLL VENOUS BLD VENIPUNCTURE: CPT

## 2019-04-26 PROCEDURE — 99214 OFFICE O/P EST MOD 30 MIN: CPT | Performed by: PHYSICIAN ASSISTANT

## 2019-04-26 RX ORDER — CIPROFLOXACIN 500 MG/1
500 TABLET, FILM COATED ORAL EVERY 12 HOURS SCHEDULED
Qty: 20 TABLET | Refills: 0 | Status: SHIPPED | OUTPATIENT
Start: 2019-04-26 | End: 2019-05-06

## 2019-04-26 RX ORDER — METRONIDAZOLE 500 MG/1
500 TABLET ORAL EVERY 8 HOURS SCHEDULED
Qty: 30 TABLET | Refills: 0 | Status: SHIPPED | OUTPATIENT
Start: 2019-04-26 | End: 2019-05-06

## 2019-04-26 RX ADMIN — IOHEXOL 100 ML: 350 INJECTION, SOLUTION INTRAVENOUS at 15:34

## 2019-04-30 ENCOUNTER — OFFICE VISIT (OUTPATIENT)
Dept: FAMILY MEDICINE CLINIC | Facility: CLINIC | Age: 39
End: 2019-04-30
Payer: COMMERCIAL

## 2019-04-30 VITALS
DIASTOLIC BLOOD PRESSURE: 76 MMHG | SYSTOLIC BLOOD PRESSURE: 124 MMHG | WEIGHT: 171.6 LBS | TEMPERATURE: 98.8 F | HEIGHT: 65 IN | BODY MASS INDEX: 28.59 KG/M2

## 2019-04-30 DIAGNOSIS — R10.32 LEFT LOWER QUADRANT PAIN: Primary | ICD-10-CM

## 2019-04-30 DIAGNOSIS — R19.7 DIARRHEA, UNSPECIFIED TYPE: ICD-10-CM

## 2019-04-30 PROCEDURE — 3008F BODY MASS INDEX DOCD: CPT | Performed by: PHYSICIAN ASSISTANT

## 2019-04-30 PROCEDURE — 99213 OFFICE O/P EST LOW 20 MIN: CPT | Performed by: PHYSICIAN ASSISTANT

## 2019-05-03 ENCOUNTER — TELEPHONE (OUTPATIENT)
Dept: FAMILY MEDICINE CLINIC | Facility: CLINIC | Age: 39
End: 2019-05-03

## 2019-05-10 ENCOUNTER — APPOINTMENT (EMERGENCY)
Dept: CT IMAGING | Facility: HOSPITAL | Age: 39
End: 2019-05-10
Payer: COMMERCIAL

## 2019-05-10 ENCOUNTER — HOSPITAL ENCOUNTER (EMERGENCY)
Facility: HOSPITAL | Age: 39
Discharge: HOME | End: 2019-05-10
Attending: EMERGENCY MEDICINE
Payer: COMMERCIAL

## 2019-05-10 VITALS
RESPIRATION RATE: 18 BRPM | TEMPERATURE: 98.7 F | WEIGHT: 169.53 LBS | HEART RATE: 84 BPM | HEIGHT: 65 IN | OXYGEN SATURATION: 96 % | BODY MASS INDEX: 28.25 KG/M2 | DIASTOLIC BLOOD PRESSURE: 68 MMHG | SYSTOLIC BLOOD PRESSURE: 112 MMHG

## 2019-05-10 DIAGNOSIS — R10.32 LEFT LOWER QUADRANT PAIN: Primary | ICD-10-CM

## 2019-05-10 LAB
ALBUMIN SERPL BCP-MCNC: 3.7 G/DL (ref 3.5–5)
ALP SERPL-CCNC: 85 U/L (ref 46–116)
ALT SERPL W P-5'-P-CCNC: 31 U/L (ref 12–78)
ANION GAP SERPL CALCULATED.3IONS-SCNC: 8 MMOL/L (ref 4–13)
APTT PPP: 27 SECONDS (ref 26–38)
AST SERPL W P-5'-P-CCNC: 18 U/L (ref 5–45)
BASOPHILS # BLD AUTO: 0.05 THOUSANDS/ΜL (ref 0–0.1)
BASOPHILS NFR BLD AUTO: 1 % (ref 0–1)
BILIRUB SERPL-MCNC: 0.9 MG/DL (ref 0.2–1)
BILIRUB UR QL STRIP: NEGATIVE
BUN SERPL-MCNC: 13 MG/DL (ref 5–25)
CALCIUM SERPL-MCNC: 9.2 MG/DL (ref 8.3–10.1)
CHLORIDE SERPL-SCNC: 105 MMOL/L (ref 100–108)
CLARITY UR: CLEAR
CO2 SERPL-SCNC: 28 MMOL/L (ref 21–32)
COLOR UR: YELLOW
CREAT SERPL-MCNC: 1.09 MG/DL (ref 0.6–1.3)
EOSINOPHIL # BLD AUTO: 0.16 THOUSAND/ΜL (ref 0–0.61)
EOSINOPHIL NFR BLD AUTO: 2 % (ref 0–6)
ERYTHROCYTE [DISTWIDTH] IN BLOOD BY AUTOMATED COUNT: 14.1 % (ref 11.6–15.1)
GFR SERPL CREATININE-BSD FRML MDRD: 86 ML/MIN/1.73SQ M
GLUCOSE SERPL-MCNC: 84 MG/DL (ref 65–140)
GLUCOSE UR STRIP-MCNC: NEGATIVE MG/DL
HCT VFR BLD AUTO: 46.3 % (ref 36.5–49.3)
HGB BLD-MCNC: 14.8 G/DL (ref 12–17)
HGB UR QL STRIP.AUTO: NEGATIVE
IMM GRANULOCYTES # BLD AUTO: 0.03 THOUSAND/UL (ref 0–0.2)
IMM GRANULOCYTES NFR BLD AUTO: 0 % (ref 0–2)
INR PPP: 1.04 (ref 0.86–1.17)
KETONES UR STRIP-MCNC: NEGATIVE MG/DL
LACTATE SERPL-SCNC: 1.4 MMOL/L (ref 0.5–2)
LEUKOCYTE ESTERASE UR QL STRIP: NEGATIVE
LIPASE SERPL-CCNC: 147 U/L (ref 73–393)
LYMPHOCYTES # BLD AUTO: 2 THOUSANDS/ΜL (ref 0.6–4.47)
LYMPHOCYTES NFR BLD AUTO: 23 % (ref 14–44)
MCH RBC QN AUTO: 27.3 PG (ref 26.8–34.3)
MCHC RBC AUTO-ENTMCNC: 32 G/DL (ref 31.4–37.4)
MCV RBC AUTO: 85 FL (ref 82–98)
MONOCYTES # BLD AUTO: 0.76 THOUSAND/ΜL (ref 0.17–1.22)
MONOCYTES NFR BLD AUTO: 9 % (ref 4–12)
NEUTROPHILS # BLD AUTO: 5.7 THOUSANDS/ΜL (ref 1.85–7.62)
NEUTS SEG NFR BLD AUTO: 65 % (ref 43–75)
NITRITE UR QL STRIP: NEGATIVE
NRBC BLD AUTO-RTO: 0 /100 WBCS
PH UR STRIP.AUTO: 7.5 [PH]
PLATELET # BLD AUTO: 220 THOUSANDS/UL (ref 149–390)
PMV BLD AUTO: 10.4 FL (ref 8.9–12.7)
POTASSIUM SERPL-SCNC: 4.3 MMOL/L (ref 3.5–5.3)
PROT SERPL-MCNC: 7.1 G/DL (ref 6.4–8.2)
PROT UR STRIP-MCNC: NEGATIVE MG/DL
PROTHROMBIN TIME: 13.1 SECONDS (ref 11.8–14.2)
RBC # BLD AUTO: 5.43 MILLION/UL (ref 3.88–5.62)
SODIUM SERPL-SCNC: 141 MMOL/L (ref 136–145)
SP GR UR STRIP.AUTO: 1.01 (ref 1–1.03)
UROBILINOGEN UR QL STRIP.AUTO: 0.2 E.U./DL
WBC # BLD AUTO: 8.7 THOUSAND/UL (ref 4.31–10.16)

## 2019-05-10 PROCEDURE — 81003 URINALYSIS AUTO W/O SCOPE: CPT | Performed by: PHYSICIAN ASSISTANT

## 2019-05-10 PROCEDURE — 36415 COLL VENOUS BLD VENIPUNCTURE: CPT | Performed by: PHYSICIAN ASSISTANT

## 2019-05-10 PROCEDURE — 99284 EMERGENCY DEPT VISIT MOD MDM: CPT | Performed by: PHYSICIAN ASSISTANT

## 2019-05-10 PROCEDURE — 85610 PROTHROMBIN TIME: CPT | Performed by: PHYSICIAN ASSISTANT

## 2019-05-10 PROCEDURE — 80053 COMPREHEN METABOLIC PANEL: CPT | Performed by: PHYSICIAN ASSISTANT

## 2019-05-10 PROCEDURE — 74177 CT ABD & PELVIS W/CONTRAST: CPT

## 2019-05-10 PROCEDURE — 96361 HYDRATE IV INFUSION ADD-ON: CPT

## 2019-05-10 PROCEDURE — 85025 COMPLETE CBC W/AUTO DIFF WBC: CPT | Performed by: PHYSICIAN ASSISTANT

## 2019-05-10 PROCEDURE — 99284 EMERGENCY DEPT VISIT MOD MDM: CPT

## 2019-05-10 PROCEDURE — 83605 ASSAY OF LACTIC ACID: CPT | Performed by: PHYSICIAN ASSISTANT

## 2019-05-10 PROCEDURE — 83690 ASSAY OF LIPASE: CPT | Performed by: PHYSICIAN ASSISTANT

## 2019-05-10 PROCEDURE — 85730 THROMBOPLASTIN TIME PARTIAL: CPT | Performed by: PHYSICIAN ASSISTANT

## 2019-05-10 PROCEDURE — 96360 HYDRATION IV INFUSION INIT: CPT

## 2019-05-10 RX ORDER — METRONIDAZOLE 500 MG/1
500 TABLET ORAL EVERY 8 HOURS SCHEDULED
COMMUNITY
End: 2019-05-30 | Stop reason: HOSPADM

## 2019-05-10 RX ORDER — DICYCLOMINE HCL 20 MG
20 TABLET ORAL 2 TIMES DAILY
Qty: 20 TABLET | Refills: 0 | Status: SHIPPED | OUTPATIENT
Start: 2019-05-10 | End: 2020-10-31

## 2019-05-10 RX ORDER — CIPROFLOXACIN 500 MG/1
500 TABLET, FILM COATED ORAL EVERY 12 HOURS SCHEDULED
COMMUNITY
End: 2019-05-30

## 2019-05-10 RX ORDER — DICYCLOMINE HCL 20 MG
20 TABLET ORAL ONCE
Status: COMPLETED | OUTPATIENT
Start: 2019-05-10 | End: 2019-05-10

## 2019-05-10 RX ADMIN — SODIUM CHLORIDE 1000 ML: 0.9 INJECTION, SOLUTION INTRAVENOUS at 19:27

## 2019-05-10 RX ADMIN — IOHEXOL 100 ML: 350 INJECTION, SOLUTION INTRAVENOUS at 20:12

## 2019-05-10 RX ADMIN — DICYCLOMINE HYDROCHLORIDE 20 MG: 20 TABLET ORAL at 21:10

## 2019-05-15 ENCOUNTER — CONSULT (OUTPATIENT)
Dept: GASTROENTEROLOGY | Facility: CLINIC | Age: 39
End: 2019-05-15
Payer: COMMERCIAL

## 2019-05-15 VITALS
DIASTOLIC BLOOD PRESSURE: 87 MMHG | HEIGHT: 65 IN | BODY MASS INDEX: 27.79 KG/M2 | WEIGHT: 166.8 LBS | HEART RATE: 99 BPM | TEMPERATURE: 97.5 F | SYSTOLIC BLOOD PRESSURE: 128 MMHG

## 2019-05-15 DIAGNOSIS — R19.7 DIARRHEA, UNSPECIFIED TYPE: ICD-10-CM

## 2019-05-15 DIAGNOSIS — R10.32 LEFT LOWER QUADRANT PAIN: ICD-10-CM

## 2019-05-15 PROCEDURE — 99204 OFFICE O/P NEW MOD 45 MIN: CPT | Performed by: INTERNAL MEDICINE

## 2019-05-21 ENCOUNTER — ANESTHESIA EVENT (OUTPATIENT)
Dept: GASTROENTEROLOGY | Facility: AMBULARY SURGERY CENTER | Age: 39
End: 2019-05-21

## 2019-05-23 ENCOUNTER — TELEPHONE (OUTPATIENT)
Dept: GASTROENTEROLOGY | Facility: CLINIC | Age: 39
End: 2019-05-23

## 2019-05-30 ENCOUNTER — ANESTHESIA (OUTPATIENT)
Dept: GASTROENTEROLOGY | Facility: AMBULARY SURGERY CENTER | Age: 39
End: 2019-05-30

## 2019-05-30 ENCOUNTER — HOSPITAL ENCOUNTER (OUTPATIENT)
Dept: GASTROENTEROLOGY | Facility: AMBULARY SURGERY CENTER | Age: 39
Setting detail: OUTPATIENT SURGERY
Discharge: HOME | End: 2019-05-30
Attending: INTERNAL MEDICINE | Admitting: INTERNAL MEDICINE
Payer: COMMERCIAL

## 2019-05-30 VITALS
DIASTOLIC BLOOD PRESSURE: 75 MMHG | SYSTOLIC BLOOD PRESSURE: 122 MMHG | TEMPERATURE: 97.5 F | OXYGEN SATURATION: 97 % | BODY MASS INDEX: 27.82 KG/M2 | HEART RATE: 86 BPM | WEIGHT: 167 LBS | RESPIRATION RATE: 18 BRPM | HEIGHT: 65 IN

## 2019-05-30 DIAGNOSIS — K21.00 GASTROESOPHAGEAL REFLUX DISEASE WITH ESOPHAGITIS: Primary | ICD-10-CM

## 2019-05-30 DIAGNOSIS — R10.32 LEFT LOWER QUADRANT PAIN: ICD-10-CM

## 2019-05-30 DIAGNOSIS — R19.7 DIARRHEA, UNSPECIFIED TYPE: ICD-10-CM

## 2019-05-30 PROCEDURE — 43239 EGD BIOPSY SINGLE/MULTIPLE: CPT | Performed by: INTERNAL MEDICINE

## 2019-05-30 PROCEDURE — 45380 COLONOSCOPY AND BIOPSY: CPT | Performed by: INTERNAL MEDICINE

## 2019-05-30 PROCEDURE — 88305 TISSUE EXAM BY PATHOLOGIST: CPT | Performed by: PATHOLOGY

## 2019-05-30 RX ORDER — SODIUM CHLORIDE 9 MG/ML
125 INJECTION, SOLUTION INTRAVENOUS CONTINUOUS
Status: DISCONTINUED | OUTPATIENT
Start: 2019-05-30 | End: 2019-06-03 | Stop reason: HOSPADM

## 2019-05-30 RX ORDER — LIDOCAINE HYDROCHLORIDE 10 MG/ML
INJECTION, SOLUTION INFILTRATION; PERINEURAL AS NEEDED
Status: DISCONTINUED | OUTPATIENT
Start: 2019-05-30 | End: 2019-05-30 | Stop reason: SURG

## 2019-05-30 RX ORDER — PANTOPRAZOLE SODIUM 40 MG/1
40 TABLET, DELAYED RELEASE ORAL DAILY
Qty: 30 TABLET | Refills: 3 | Status: SHIPPED | OUTPATIENT
Start: 2019-05-30 | End: 2020-10-31

## 2019-05-30 RX ORDER — SODIUM CHLORIDE, SODIUM LACTATE, POTASSIUM CHLORIDE, CALCIUM CHLORIDE 600; 310; 30; 20 MG/100ML; MG/100ML; MG/100ML; MG/100ML
INJECTION, SOLUTION INTRAVENOUS CONTINUOUS PRN
Status: DISCONTINUED | OUTPATIENT
Start: 2019-05-30 | End: 2019-05-30 | Stop reason: SURG

## 2019-05-30 RX ORDER — PROPOFOL 10 MG/ML
INJECTION, EMULSION INTRAVENOUS AS NEEDED
Status: DISCONTINUED | OUTPATIENT
Start: 2019-05-30 | End: 2019-05-30 | Stop reason: SURG

## 2019-05-30 RX ADMIN — PROPOFOL 20 MG: 10 INJECTION, EMULSION INTRAVENOUS at 09:58

## 2019-05-30 RX ADMIN — SODIUM CHLORIDE, SODIUM LACTATE, POTASSIUM CHLORIDE, AND CALCIUM CHLORIDE: .6; .31; .03; .02 INJECTION, SOLUTION INTRAVENOUS at 09:08

## 2019-05-30 RX ADMIN — LIDOCAINE HYDROCHLORIDE ANHYDROUS 50 MG: 10 INJECTION, SOLUTION INFILTRATION at 09:47

## 2019-05-30 RX ADMIN — PROPOFOL 50 MG: 10 INJECTION, EMULSION INTRAVENOUS at 09:50

## 2019-05-30 RX ADMIN — PROPOFOL 30 MG: 10 INJECTION, EMULSION INTRAVENOUS at 09:52

## 2019-05-30 RX ADMIN — PROPOFOL 50 MG: 10 INJECTION, EMULSION INTRAVENOUS at 10:04

## 2019-05-30 RX ADMIN — PROPOFOL 100 MG: 10 INJECTION, EMULSION INTRAVENOUS at 09:47

## 2019-05-30 RX ADMIN — PROPOFOL 50 MG: 10 INJECTION, EMULSION INTRAVENOUS at 09:55

## 2019-06-28 ENCOUNTER — TELEPHONE (OUTPATIENT)
Dept: GASTROENTEROLOGY | Facility: AMBULARY SURGERY CENTER | Age: 39
End: 2019-06-28

## 2019-08-06 ENCOUNTER — TELEPHONE (OUTPATIENT)
Dept: OBGYN CLINIC | Facility: HOSPITAL | Age: 39
End: 2019-08-06

## 2019-08-06 NOTE — TELEPHONE ENCOUNTER
Patient provided Workers Comp Info for his appointment with Dr Erick Hightower: 6 12 19  [place]  [address] Name: Jass Reynago  [phone]

## 2019-09-09 ENCOUNTER — OFFICE VISIT (OUTPATIENT)
Dept: OBGYN CLINIC | Facility: HOSPITAL | Age: 39
End: 2019-09-09
Payer: OTHER MISCELLANEOUS

## 2019-09-09 VITALS
HEIGHT: 65 IN | WEIGHT: 184 LBS | DIASTOLIC BLOOD PRESSURE: 84 MMHG | HEART RATE: 92 BPM | SYSTOLIC BLOOD PRESSURE: 135 MMHG | BODY MASS INDEX: 30.66 KG/M2

## 2019-09-09 DIAGNOSIS — M51.16 LUMBAR DISC HERNIATION WITH RADICULOPATHY: Primary | ICD-10-CM

## 2019-09-09 DIAGNOSIS — M51.36 DISC DEGENERATION, LUMBAR: ICD-10-CM

## 2019-09-09 DIAGNOSIS — M54.16 LUMBAR RADICULOPATHY: ICD-10-CM

## 2019-09-09 PROCEDURE — 99204 OFFICE O/P NEW MOD 45 MIN: CPT | Performed by: ORTHOPAEDIC SURGERY

## 2019-09-09 RX ORDER — GABAPENTIN 600 MG/1
600 TABLET ORAL 3 TIMES DAILY
COMMUNITY
End: 2020-10-31

## 2019-09-09 NOTE — PROGRESS NOTES
Assessment/Plan:    Lumbar disc herniation with radiculopathy  Patient presents for evaluation of acute on chronic flare-up of left-sided lower back and leg pain  He reports history of lumbar DDD L4-5 and L5-S1 with associated stenosis  He has had previous surgery in the past over 10 years ago  Over the years he has tried epidural steroid injections  His most recent injection roughly 6 weeks ago did not provide lasting relief  He is a smoker  He is currently not working  He is under the care of the occupational medicine doctors for his work related issues  On physical exam he appears stated age  He has altered sensation to light touch left thigh, left medial and lateral leg compared to the contralateral   He has 4 to 4+ out of 5 strength with foot dorsiflexion and plantar flexion on the left compared to the contralateral which is stable 5/5 from L2-S1    Imaging demonstrates degenerative changes at L4-5 and L5-S1 with left-sided stenosis at L4-5 and L5-S1  There is some scar tissue at L5-S1 on the left in his previous surgical bed    Assessment and plan    Lumbar DDD  Post-laminectomy syndrome  Lumbar radiculopathy  Treatment options were discussed including conservative management versus surgical intervention in the way of revision decompression alone or revision decompression with fusion    At this time it sounds like he has only received epidural steroid injections recently  I do advise consideration for lumbar rhizotomy as well as possible sacroiliac joint injection on the left  Smoking cessation was discussed in great detail  He ultimately may still need revision decompression with possible instrumented fusion in the future    I did for work-related occupational medicine related issues to his [facility] doctor      · Left low back pain with left leg pain and numbness  · History of lumbar surgery 16 years ago  · Surgical vs non-surgical options discussed  · The patient is advised to pursue additional lumbar injections prior to considering surgery  · Surgery can be considered if patient fails conservative care  · Lumbar discectomy vs fusion  · The patient is instructed to stop using tobacco products  · The patient should return to pain management for lumbar injections  · Consider L4/5 and L5/S1 RFA  · He should follow as needed       Problem List Items Addressed This Visit        Nervous and Auditory    Lumbar disc herniation with radiculopathy - Primary     Patient presents for evaluation of acute on chronic flare-up of left-sided lower back and leg pain  He reports history of lumbar DDD L4-5 and L5-S1 with associated stenosis  He has had previous surgery in the past over 10 years ago  Over the years he has tried epidural steroid injections  His most recent injection roughly 6 weeks ago did not provide lasting relief  He is a smoker  He is currently not working  He is under the care of the occupational medicine doctors for his work related issues  On physical exam he appears stated age  He has altered sensation to light touch left thigh, left medial and lateral leg compared to the contralateral   He has 4 to 4+ out of 5 strength with foot dorsiflexion and plantar flexion on the left compared to the contralateral which is stable 5/5 from L2-S1    Imaging demonstrates degenerative changes at L4-5 and L5-S1 with left-sided stenosis at L4-5 and L5-S1  There is some scar tissue at L5-S1 on the left in his previous surgical bed    Assessment and plan    Lumbar DDD  Post-laminectomy syndrome  Lumbar radiculopathy  Treatment options were discussed including conservative management versus surgical intervention in the way of revision decompression alone or revision decompression with fusion    At this time it sounds like he has only received epidural steroid injections recently    I do advise consideration for lumbar rhizotomy as well as possible sacroiliac joint injection on the left  Smoking cessation was discussed in great detail  He ultimately may still need revision decompression with possible instrumented fusion in the future  I did for work-related occupational medicine related issues to his [facility] doctor         Relevant Orders    Ambulatory referral to Pain Management    Lumbar radiculopathy    Relevant Orders    Ambulatory referral to Pain Management       Musculoskeletal and Integument    Disc degeneration, lumbar    Relevant Orders    Ambulatory referral to Pain Management            Subjective:      Patient ID: Kendy Logan is a 46 y o  male  HPI   The patient is here for initial evaluation of low back pain with left leg pain  He has had symptoms for about 3 months following a work related injury  He had tripped while carrying a propane tank and had immediate pain  Today he complains of left low back pain with left posterior thigh, calf and dorsal foot pain, tingling and numbness  He rates his pain at 8/10 and 10/10 at its worse  Everything aggravates  He is unsure what alleviates  He does use gabapentin 1800mg/day  He did try physical therapy with no benefit  He did have lumbar epidural 8/2019 with Dr Britton in [address] [place] [address]  He is s/p discectomy 16 years ago  He denies recent bowel or bladder changes  He is currently out of work per [address]  The patient is a current smoker  The following portions of the patient's history were reviewed and updated as appropriate: allergies, current medications, past family history, past medical history, past social history, past surgical history and problem list     Review of Systems   Constitutional: Negative for chills, fever and unexpected weight change  HENT: Negative for hearing loss, nosebleeds and sore throat  Eyes: Negative for pain, redness and visual disturbance  Respiratory: Negative for cough, shortness of breath and wheezing  Cardiovascular: Negative for chest pain, palpitations and leg swelling     Gastrointestinal: Negative for abdominal pain, nausea and vomiting  Endocrine: Negative for polydipsia and polyuria  Genitourinary: Negative for difficulty urinating and hematuria  Skin: Negative for rash and wound  Psychiatric/Behavioral: Negative for decreased concentration and suicidal ideas  The patient is not nervous/anxious  Objective:      /84   Pulse 92   Ht 5' 5" (1 651 m)   Wt 83 5 kg (184 lb)   BMI 30 62 kg/m²          Physical Exam   Constitutional: He is oriented to person, place, and time  He appears well-developed and well-nourished  HENT:   Right Ear: External ear normal    Left Ear: External ear normal    Nose: Nose normal    Eyes: Conjunctivae are normal    Neck: Normal range of motion  Pulmonary/Chest: Effort normal    Neurological: He is alert and oriented to person, place, and time  Skin: Skin is warm and dry  Psychiatric: He has a normal mood and affect   His behavior is normal  Judgment and thought content normal        Patient ambulates without assistance  Tender to palpation over lumbosacral junction  Modified straight leg raise negative bilaterally   Strength L2-S1 5/5 bilaterally with exception of left DF and PF 4+/5  Sensation L2-S1 intact bilaterally with diminished left medial and lateral leg and thigh       Imaging:  Lumbar MRI reviewed  6/12/19    Scribe Attestation    I,:   [place] Elders am acting as a scribe while in the presence of the attending physician :        I,:   Winston Hoover MD personally performed the services described in this documentation    as scribed in my presence :

## 2019-09-09 NOTE — ASSESSMENT & PLAN NOTE
Patient presents for evaluation of acute on chronic flare-up of left-sided lower back and leg pain  He reports history of lumbar DDD L4-5 and L5-S1 with associated stenosis  He has had previous surgery in the past over 10 years ago  Over the years he has tried epidural steroid injections  His most recent injection roughly 6 weeks ago did not provide lasting relief  He is a smoker  He is currently not working  He is under the care of the occupational medicine doctors for his work related issues  On physical exam he appears stated age  He has altered sensation to light touch left thigh, left medial and lateral leg compared to the contralateral   He has 4 to 4+ out of 5 strength with foot dorsiflexion and plantar flexion on the left compared to the contralateral which is stable 5/5 from L2-S1    Imaging demonstrates degenerative changes at L4-5 and L5-S1 with left-sided stenosis at L4-5 and L5-S1  There is some scar tissue at L5-S1 on the left in his previous surgical bed    Assessment and plan    Lumbar DDD  Post-laminectomy syndrome  Lumbar radiculopathy  Treatment options were discussed including conservative management versus surgical intervention in the way of revision decompression alone or revision decompression with fusion    At this time it sounds like he has only received epidural steroid injections recently  I do advise consideration for lumbar rhizotomy as well as possible sacroiliac joint injection on the left  Smoking cessation was discussed in great detail  He ultimately may still need revision decompression with possible instrumented fusion in the future    I did for work-related occupational medicine related issues to his [facility] doctor

## 2019-10-21 ENCOUNTER — TELEPHONE (OUTPATIENT)
Dept: OBGYN CLINIC | Facility: HOSPITAL | Age: 39
End: 2019-10-21

## 2019-10-21 NOTE — TELEPHONE ENCOUNTER
Joanna from [facility] pt's nurse  called wanted to know which injections you were recommending for patient  He is seeing a spine and pain management not through [facility]   Her call back number is [phone]

## 2019-10-21 NOTE — TELEPHONE ENCOUNTER
Left detailed msg on Joanna's voicemail  I asked her to call our office should she have any more questions

## 2019-12-09 ENCOUNTER — TELEPHONE (OUTPATIENT)
Dept: OBGYN CLINIC | Facility: HOSPITAL | Age: 39
End: 2019-12-09

## 2019-12-09 NOTE — TELEPHONE ENCOUNTER
Patient is calling since he has seen pain management and he is wondering if everything was going as the doctor planned  He has gotten 2 rounds of injections so far and would like to speak to you regarding everything

## 2020-09-02 ENCOUNTER — EVALUATION (OUTPATIENT)
Dept: PHYSICAL THERAPY | Facility: CLINIC | Age: 40
End: 2020-09-02
Payer: OTHER MISCELLANEOUS

## 2020-09-02 ENCOUNTER — TRANSCRIBE ORDERS (OUTPATIENT)
Dept: PHYSICAL THERAPY | Facility: CLINIC | Age: 40
End: 2020-09-02

## 2020-09-02 DIAGNOSIS — M51.36 DISC DEGENERATION, LUMBAR: Primary | ICD-10-CM

## 2020-09-02 DIAGNOSIS — M51.16 LUMBAR DISC HERNIATION WITH RADICULOPATHY: ICD-10-CM

## 2020-09-02 DIAGNOSIS — M54.16 LUMBAR RADICULOPATHY: ICD-10-CM

## 2020-09-02 PROCEDURE — 97535 SELF CARE MNGMENT TRAINING: CPT | Performed by: PHYSICAL THERAPIST

## 2020-09-02 PROCEDURE — 97110 THERAPEUTIC EXERCISES: CPT | Performed by: PHYSICAL THERAPIST

## 2020-09-02 PROCEDURE — 97140 MANUAL THERAPY 1/> REGIONS: CPT | Performed by: PHYSICAL THERAPIST

## 2020-09-02 PROCEDURE — 97161 PT EVAL LOW COMPLEX 20 MIN: CPT | Performed by: PHYSICAL THERAPIST

## 2020-09-02 NOTE — LETTER
September 3, 2020    [address]    Patient: Shantelle Greene   YOB: 1980   Date of Visit: 2020     Encounter Diagnosis     ICD-10-CM    1  Disc degeneration, lumbar  M51 36    2  Lumbar disc herniation with radiculopathy  M51 16    3  Lumbar radiculopathy  M54 16        Dear Dr Arnie Gutierrez:    Thank you for your recent referral of Shantelle Greene  Please review the attached evaluation summary from Tremaine's recent visit  Please verify that you agree with the plan of care by signing the attached order  If you have any questions or concerns, please do not hesitate to call  I sincerely appreciate the opportunity to share in the care of one of your patients and hope to have another opportunity to work with you in the near future  Sincerely,    Ida Jackson, PT, DPT, ATC, ART      Referring Provider:      I certify that I have read the below Plan of Care and certify the need for these services furnished under this plan of treatment while under my care  Keeley Duran PA-C  [address]: [phone]          PT Evaluation     Today's date: 2020  Patient name: Shantelle Greene  : 1980  MRN: [id number]  Referring provider: Carmen Jacques PA-C  Dx:   Encounter Diagnosis     ICD-10-CM    1  Disc degeneration, lumbar  M51 36    2  Lumbar disc herniation with radiculopathy  M51 16    3   Lumbar radiculopathy  M54 16                   Assessment  Understanding of Dx/Px/POC: good   Prognosis: good    Goals  STGs: To be complete within 4 weeks  - Decrease/centralize pain to < 2/10 at worst  - Increase lumbar extension ROM to WNL  - Increase bilateral hip flexor flexibility to WNL  - Increase core stability and posterior lateral LE chain strength to > 4/5  - Improve postural awareness capacity to > 60min before deficit    LTGs: To be complete within 6 weeks  - Able to sit for any extended amount of time without pain or limitation for increased safety and functional capacity with ADLs and work-related duty  - Able to complete all standing activity without pain or limitation for increased safety and functional capacity with ADLs and work-related duty  - Able to complete all walking activity for any extended amount of time/distance without pain or limitation for increased safety and functional capacity with ADLs and work-related duty  - Able to repetitively bend over at trunk without pain or limitation for increased safety and functional capacity with ADLs and and work-related duty  - Able to complete all lifting/carrying activity without pain or limitation for increased safety and functional capacity with ADLs and work-related duty  - Able to complete transfers repetitively without pain or limitation for increased safety and functional capacity with ADLs and work-related duty    Plan  Planned therapy interventions: manual therapy, neuromuscular re-education, patient education, self care, therapeutic exercise and therapeutic activities  Frequency: 3x week  Duration in weeks: 4       Pt is a 45 y o  male with chronic LBP and L LE weakness and radicular sx who presents with functional deficits including decreased capacity with sitting, standing, walking, lifting/carrying, transfers, and bending over at the trunk  Upon completion of today's initial evaluation, Marianela Allen' sx remain consistent with being < 2 months s/p lumbar decompression surgery   Surgery is a result of an acute tripping and twisting incident that happened at work [place]   Pt will benefit from skilled physical therapy to address current deficits          Subjective Evaluation    Pain  Current pain ratin  At best pain ratin  At worst pain ratin  Location: Lumbar and L LE radciualr sx    Patient Goals  Patient goals for therapy: increased strength, decreased pain and increased motion         Pt reports he is < 2 months s/p Lumbar decompression surgery July, 2020  Pt does not know the exact date in early July  Pt reports the surgery helped some with L LE radicular sx intensity, but still remains, as well as significant weakness that continues to negatively impact his overall safety and functional capacity with ADLs and work-related duties          Objective Pain level ranges 2-8/10  AROM: Lumbar extension 25%, flexion 50%, rot 50%; L Hip Extension 50%, IR 50%  Strength: Core stability and post/lat LE chain strength 3/5  Postural Awareness: Poor (flexed trunk, Ant pelvic tilt)  Gait: SPC, Mod to severe lateral limp/trendelenberg pattern favoring L LE  Hip flexor flexibility: (+) Sandor Test; Mod to severe shortening L Hip flexor; R Mild shortening  FOTO: 46; GOAL 53  Sensation: Diminished posteriorly below knee (L4, L5, S1, S2 dermatomes)  Unable sit without pain and limitation  Unable to stand without pain and limitation  Unable to walk without pain and limitation  Unable to bend over at trunk without pain and limitation  Unable to complete lifting/carrying activity without pain and limitation  Unable to complete transfers without pain and limitation    Flowsheet Rows      Most Recent Value   PT/OT G-Codes   Current Score  46   Projected Score  53               Precautions: MD protocol for Lumbar decompression surgery July, 2020    Daily Treatment Diary    HEP: Handout provided and discussed      Manuals 9/2/20            ART x25'            PROM/Stretch             IASTM             STM/Triggerpoint             JM                          Neuro Re-Ed             PPU 5x            SHADY x10'            SLB  Next session           Cat Backs  Next session                                                  Ther Ex             Prone Hip Ext L LE only 2x10            Prone Hip IR 2x10 L2            Prone Hip ER 2x10            TKEs  Next session           Bridge with Add Sq  Next session Ther Activity             P-Bars Forward/backward walk  Next session           Step ups  Next session           Lat Walk  Next session           Hurdles  Next session           Sit to stands  Next session                                                  Gait Training              x10'                         Modalities             MHP  Next sessionProne MHP pillows instead of wedge           CP x10' Prone with pilliows under chest            US/Stim

## 2020-09-02 NOTE — PROGRESS NOTES
PT Evaluation     Today's date: 2020  Patient name: Mayco Adames  : 1980  MRN: [id number]  Referring provider: Sharla Ruvalcaba PA-C  Dx:   Encounter Diagnosis     ICD-10-CM    1  Disc degeneration, lumbar  M51 36    2  Lumbar disc herniation with radiculopathy  M51 16    3   Lumbar radiculopathy  M54 16                   Assessment  Understanding of Dx/Px/POC: good   Prognosis: good    Goals  STGs: To be complete within 4 weeks  - Decrease/centralize pain to < 2/10 at worst  - Increase lumbar extension ROM to WNL  - Increase bilateral hip flexor flexibility to WNL  - Increase core stability and posterior lateral LE chain strength to > 4/5  - Improve postural awareness capacity to > 60min before deficit    LTGs: To be complete within 6 weeks  - Able to sit for any extended amount of time without pain or limitation for increased safety and functional capacity with ADLs and work-related duty  - Able to complete all standing activity without pain or limitation for increased safety and functional capacity with ADLs and work-related duty  - Able to complete all walking activity for any extended amount of time/distance without pain or limitation for increased safety and functional capacity with ADLs and work-related duty  - Able to repetitively bend over at trunk without pain or limitation for increased safety and functional capacity with ADLs and and work-related duty  - Able to complete all lifting/carrying activity without pain or limitation for increased safety and functional capacity with ADLs and work-related duty  - Able to complete transfers repetitively without pain or limitation for increased safety and functional capacity with ADLs and work-related duty    Plan  Planned therapy interventions: manual therapy, neuromuscular re-education, patient education, self care, therapeutic exercise and therapeutic activities  Frequency: 3x week  Duration in weeks: 4       Pt is a 45 y o  male with chronic LBP and L LE weakness and radicular sx who presents with functional deficits including decreased capacity with sitting, standing, walking, lifting/carrying, transfers, and bending over at the trunk  Upon completion of today's initial evaluation, Leila Silva' sx remain consistent with being < 2 months s/p lumbar decompression surgery   Surgery is a result of an acute tripping and twisting incident that happened at [place]   Pt will benefit from skilled physical therapy to address current deficits  Subjective Evaluation    Pain  Current pain ratin  At best pain ratin  At worst pain ratin  Location: Lumbar and L LE radciualr sx    Patient Goals  Patient goals for therapy: increased strength, decreased pain and increased motion         Pt reports he is < 2 months s/p Lumbar decompression surgery   Pt does not know the exact date in early July  Pt reports the surgery helped some with L LE radicular sx intensity, but still remains, as well as significant weakness that continues to negatively impact his overall safety and functional capacity with ADLs and work-related duties          Objective Pain level ranges 2-8/10  AROM: Lumbar extension 25%, flexion 50%, rot 50%; L Hip Extension 50%, IR 50%  Strength: Core stability and post/lat LE chain strength 3/5  Postural Awareness: Poor (flexed trunk, Ant pelvic tilt)  Gait: SPC, Mod to severe lateral limp/trendelenberg pattern favoring L LE  Hip flexor flexibility: (+) Sandor Test; Mod to severe shortening L Hip flexor; R Mild shortening  FOTO: 46; GOAL 53  Sensation: Diminished posteriorly below knee (L4, L5, S1, S2 dermatomes)  Unable sit without pain and limitation  Unable to stand without pain and limitation  Unable to walk without pain and limitation  Unable to bend over at trunk without pain and limitation  Unable to complete lifting/carrying activity without pain and limitation  Unable to complete transfers without pain and limitation    Flowsheet Rows      Most Recent Value   PT/OT G-Codes   Current Score  46   Projected Score  53               Precautions: MD protocol for Lumbar decompression surgery July, 2020    Daily Treatment Diary    HEP: Handout provided and discussed      Manuals 9/2/20            ART x25'            PROM/Stretch             IASTM             STM/Triggerpoint             JM                          Neuro Re-Ed             PPU 5x            SHADY x10'            SLB  Next session           Cat Backs  Next session                                                  Ther Ex             Prone Hip Ext L LE only 2x10            Prone Hip IR 2x10 L2            Prone Hip ER 2x10            TKEs  Next session           Bridge with Add Sq  Next session                                                               Ther Activity             P-Bars Forward/backward walk  Next session           Step ups  Next session           Lat Walk  Next session           Hurdles  Next session           Sit to stands  Next session                                                  Gait Training              x10'                         Modalities             MHP  Next sessionProne MHP pillows instead of wedge           CP x10' Prone with pilliows under chest            US/Stim

## 2020-09-03 ENCOUNTER — OFFICE VISIT (OUTPATIENT)
Dept: PHYSICAL THERAPY | Facility: CLINIC | Age: 40
End: 2020-09-03
Payer: OTHER MISCELLANEOUS

## 2020-09-03 ENCOUNTER — TRANSCRIBE ORDERS (OUTPATIENT)
Dept: PHYSICAL THERAPY | Facility: CLINIC | Age: 40
End: 2020-09-03

## 2020-09-03 DIAGNOSIS — M51.36 DEGENERATION OF LUMBAR INTERVERTEBRAL DISC: Primary | ICD-10-CM

## 2020-09-03 DIAGNOSIS — M51.16 LUMBAR DISC HERNIATION WITH RADICULOPATHY: ICD-10-CM

## 2020-09-03 DIAGNOSIS — M54.16 LUMBAR RADICULOPATHY: ICD-10-CM

## 2020-09-03 DIAGNOSIS — M51.36 DISC DEGENERATION, LUMBAR: Primary | ICD-10-CM

## 2020-09-03 PROCEDURE — 97140 MANUAL THERAPY 1/> REGIONS: CPT | Performed by: PHYSICAL THERAPIST

## 2020-09-03 PROCEDURE — 97110 THERAPEUTIC EXERCISES: CPT | Performed by: PHYSICAL THERAPIST

## 2020-09-03 PROCEDURE — 97535 SELF CARE MNGMENT TRAINING: CPT | Performed by: PHYSICAL THERAPIST

## 2020-09-03 NOTE — PROGRESS NOTES
Daily Note     Today's date: 9/3/2020  Patient name: Delores Bennett  : 1980  MRN: [id number]  Referring provider: Melita Ramirez PA-C  Dx:   Encounter Diagnosis     ICD-10-CM    1  Disc degeneration, lumbar  M51 36    2  Lumbar disc herniation with radiculopathy  M51 16    3  Lumbar radiculopathy  M54 16                   Subjective: Pt reports HEP going well  Appropriate overall pain and soreness experienced  Anxious to continue making progress  Objective: See treatment diary below      Assessment: Tolerated treatment well  Patient demonstrated fatigue post treatment, exhibited good technique with therapeutic exercises and would benefit from continued PT      Plan: Continue per plan of care  Progress treatment as tolerated           Precautions: MD protocol for Lumbar decompression surgery     Daily Treatment Diary    HEP: Handout provided and discussed      Manuals 9/2/20 9/3/20           ART x25' x10'           PROM/Stretch             IASTM             STM/Triggerpoint             JM  Prone P to A grade I, II Lumbar x5'                        Neuro Re-Ed             PPU 5x 5x           SHADY x10' x20'           SLB  Next session           Cat Backs  3x10                                                  Ther Ex             Prone Hip Ext L LE only 2x10 2x10           Prone Hip IR 2x10 L2 2x10 L2           Prone Hip ER 2x10            TKEs  3x10 L3           Bridge with Add Sq  Next session                                                               Ther Activity             P-Bars Forward/backward walk  Next session           Step ups  Next session           Lat Walk  2x10 Pbars           Hurdles  Next session           Sit to stands  Next session                                                  Gait Training              x10' x10'                        Modalities             MHP  Prone pillows under chest x10'           CP x10' Prone with pilliows under chest            US/Stim

## 2020-09-08 ENCOUNTER — OFFICE VISIT (OUTPATIENT)
Dept: PHYSICAL THERAPY | Facility: CLINIC | Age: 40
End: 2020-09-08
Payer: OTHER MISCELLANEOUS

## 2020-09-08 DIAGNOSIS — M54.16 LUMBAR RADICULOPATHY: ICD-10-CM

## 2020-09-08 DIAGNOSIS — M51.16 LUMBAR DISC HERNIATION WITH RADICULOPATHY: ICD-10-CM

## 2020-09-08 DIAGNOSIS — M51.36 DISC DEGENERATION, LUMBAR: Primary | ICD-10-CM

## 2020-09-08 PROCEDURE — 97530 THERAPEUTIC ACTIVITIES: CPT

## 2020-09-08 PROCEDURE — 97140 MANUAL THERAPY 1/> REGIONS: CPT

## 2020-09-08 PROCEDURE — 97110 THERAPEUTIC EXERCISES: CPT

## 2020-09-08 NOTE — PROGRESS NOTES
Daily Note     Today's date: 2020  Patient name: Guillermo Andre  : 1980  MRN: [id number]  Referring provider: Janna Orozco PA-C  Dx:   Encounter Diagnosis     ICD-10-CM    1  Disc degeneration, lumbar  M51 36    2  Lumbar disc herniation with radiculopathy  M51 16    3  Lumbar radiculopathy  M54 16                   Subjective: Pt reports he is doing well but cont to have L LE weakness  Objective: See treatment diary below      Assessment: Tolerated treatment fair  Patient demonstrated fatigue post treatment  Pt merissa rx well with min c/o LS pain  Pt with c/o L LE fatigue with program  Pt able to complete progressions today  Plan: Continue per plan of care        Precautions: MD protocol for Lumbar decompression surgery     Daily Treatment Diary    HEP: Handout provided and discussed      Manuals 9/2/20 9/3/20 9/8/20          ART x25' x10'           PROM/Stretch             IASTM             STM/Triggerpoint   15'          JM  Prone P to A grade I, II Lumbar x5'                        Neuro Re-Ed             PPU 5x 5x 5x          SHADY x10' x20' 20'          SLB  Next session 3/10          Cat Backs  3x10                                                  Ther Ex             Prone Hip Ext L LE only 2x10 2x10 2/10          Prone Hip IR 2x10 L2 2x10 L2 2/10 L2          Prone Hip ER 2x10            TKEs  3x10 L3 3/10 L3          Bridge with Add Sq  Next session 2/10                                                              Ther Activity             P-Bars Forward/backward walk  Next session 10 laps          Step ups  Next session 4" 2/10          Lat Walk  2x10 Pbars 2/10 pbars          Hurdles  Next session           Sit to stands  Next session                                                  Gait Training              x10' x10'                        Modalities             MHP  Prone pillows under chest x10' 10'          CP x10' Prone with pilliows under chest            US/Stim

## 2020-09-10 ENCOUNTER — OFFICE VISIT (OUTPATIENT)
Dept: PHYSICAL THERAPY | Facility: CLINIC | Age: 40
End: 2020-09-10
Payer: OTHER MISCELLANEOUS

## 2020-09-10 DIAGNOSIS — M51.36 DISC DEGENERATION, LUMBAR: Primary | ICD-10-CM

## 2020-09-10 DIAGNOSIS — M51.16 LUMBAR DISC HERNIATION WITH RADICULOPATHY: ICD-10-CM

## 2020-09-10 DIAGNOSIS — M54.16 LUMBAR RADICULOPATHY: ICD-10-CM

## 2020-09-10 PROCEDURE — 97110 THERAPEUTIC EXERCISES: CPT

## 2020-09-10 PROCEDURE — 97140 MANUAL THERAPY 1/> REGIONS: CPT

## 2020-09-10 PROCEDURE — 97530 THERAPEUTIC ACTIVITIES: CPT

## 2020-09-10 NOTE — PROGRESS NOTES
Daily Note     Today's date: 9/10/2020  Patient name: Jaron Rodriguez  : 1980  MRN: [id number]  Referring provider: Raul Marinelli PA-C  Dx:   Encounter Diagnosis     ICD-10-CM    1  Disc degeneration, lumbar  M51 36    2  Lumbar disc herniation with radiculopathy  M51 16    3  Lumbar radiculopathy  M54 16                   Subjective: pt reports he is doing well  States more LS soreness today  Reports some L LE weakness  Objective: See treatment diary below      Assessment: Tolerated treatment well  Patient demonstrated fatigue post treatment  Pt cont to be challenged with program  Pt with L LE fatigue with exercise  Pt c/o moderate LS pain today  Plan: Continue per plan of care        Precautions: MD protocol for Lumbar decompression surgery     Daily Treatment Diary    HEP: Handout provided and discussed      Manuals 9/2/20 9/3/20 9/8/20 9/10/20         ART x25' x10'           PROM/Stretch             IASTM             STM/Triggerpoint   15' 15'         JM  Prone P to A grade I, II Lumbar x5'                        Neuro Re-Ed             PPU 5x 5x 5x 10x         SHADY x10' x20' 20' 15'         SLB  Next session 3/10          Cat Backs  3x10  3/10                                                Ther Ex             Prone Hip Ext L LE only 2x10 2x10 2/10 2/10         Prone Hip IR 2x10 L2 2x10 L2 2/10 L2 3/10 L2         Prone Hip ER 2x10            TKEs  3x10 L3 3/10 L3 3/10  L3         Bridge with Add Sq  Next session /10 2/10                                                             Ther Activity             P-Bars Forward/backward walk  Next session 10 laps 10 laps         Step ups  Next session 4" 2/10 4" 3/10         Lat Walk  2x10 Pbars 2/10 pbars /10 // bars         Hurdles  Next session           Sit to stands  Next session                                                  Gait Training              x10' x10'                        Modalities             MHP  Prone pillows under chest x10' 10' 10'         CP x10' Prone with pilliows under chest            US/Stim

## 2020-09-14 ENCOUNTER — OFFICE VISIT (OUTPATIENT)
Dept: PHYSICAL THERAPY | Facility: CLINIC | Age: 40
End: 2020-09-14
Payer: OTHER MISCELLANEOUS

## 2020-09-14 DIAGNOSIS — M51.36 DISC DEGENERATION, LUMBAR: Primary | ICD-10-CM

## 2020-09-14 DIAGNOSIS — M54.16 LUMBAR RADICULOPATHY: ICD-10-CM

## 2020-09-14 DIAGNOSIS — M51.16 LUMBAR DISC HERNIATION WITH RADICULOPATHY: ICD-10-CM

## 2020-09-14 PROCEDURE — 97530 THERAPEUTIC ACTIVITIES: CPT

## 2020-09-14 PROCEDURE — 97140 MANUAL THERAPY 1/> REGIONS: CPT

## 2020-09-14 PROCEDURE — 97110 THERAPEUTIC EXERCISES: CPT

## 2020-09-14 NOTE — PROGRESS NOTES
Daily Note     Today's date: 2020  Patient name: Maeve Rhodes  : 1980  MRN: [id number]  Referring provider: Feroz Keys PA-C  Dx:   Encounter Diagnosis     ICD-10-CM    1  Disc degeneration, lumbar  M51 36    2  Lumbar disc herniation with radiculopathy  M51 16    3  Lumbar radiculopathy  M54 16                   Subjective: pt reports he has less pain but cont to have weakness  Objective: See treatment diary below      Assessment: Tolerated treatment well  Patient demonstrated fatigue post treatment  Pt with cont L LE weakness and is challenged with exercise  Pt merissa progressions today with notable fatigue  Plan: Continue per plan of care        Precautions: MD protocol for Lumbar decompression surgery     Daily Treatment Diary    HEP: Handout provided and discussed      Manuals 9/2/20 9/3/20 9/8/20 9/10/20 9/14/20        ART x25' x10'           PROM/Stretch             IASTM             STM/Triggerpoint   15' 15' 15'        JM  Prone P to A grade I, II Lumbar x5'                        Neuro Re-Ed             PPU 5x 5x 5x 10x 10x        SHADY x10' x20' 20' 15' 15'        SLB  Next session 3/10  3/10        Cat Backs  3x10  3/10 3/10                                               Ther Ex             Prone Hip Ext L LE only 2x10 2x10 2/10 2/10 2/10        Prone Hip IR 2x10 L2 2x10 L2 2/10 L2 3/10 L2 3/10 L2        Prone Hip ER 2x10    3/10        TKEs  3x10 L3 3/10 L3 3/10  L3 3/10 L3        Bridge with Add Sq  Next session /10 2/10 2/10        Cat and camel     2/10        SL TS rotation     20xea                                  Ther Activity             P-Bars Forward/backward walk  Next session 10 laps 10 laps 10 laps        Step ups  Next session 4" 2/10 4" 3/10 6" 3/10        Lat Walk  2x10 Pbars 2/10 pbars 2/10 // bars /10 // bars        Hurdles  Next session           Sit to stands  Next session                                                  Gait Training x10' x10'                        Modalities             MHP  Prone pillows under chest x10' 10' 10' 10'        CP x10' Prone with pilliows under chest            US/Stim

## 2020-09-15 ENCOUNTER — APPOINTMENT (OUTPATIENT)
Dept: PHYSICAL THERAPY | Facility: CLINIC | Age: 40
End: 2020-09-15
Payer: OTHER MISCELLANEOUS

## 2020-09-17 ENCOUNTER — OFFICE VISIT (OUTPATIENT)
Dept: PHYSICAL THERAPY | Facility: CLINIC | Age: 40
End: 2020-09-17
Payer: OTHER MISCELLANEOUS

## 2020-09-17 DIAGNOSIS — M51.36 DISC DEGENERATION, LUMBAR: Primary | ICD-10-CM

## 2020-09-17 DIAGNOSIS — M54.16 LUMBAR RADICULOPATHY: ICD-10-CM

## 2020-09-17 DIAGNOSIS — M51.16 LUMBAR DISC HERNIATION WITH RADICULOPATHY: ICD-10-CM

## 2020-09-17 PROCEDURE — 97110 THERAPEUTIC EXERCISES: CPT

## 2020-09-17 PROCEDURE — 97530 THERAPEUTIC ACTIVITIES: CPT

## 2020-09-17 PROCEDURE — 97140 MANUAL THERAPY 1/> REGIONS: CPT

## 2020-09-17 NOTE — PROGRESS NOTES
Daily Note     Today's date: 2020  Patient name: Maeve Rhodes  : 1980  MRN: [id number]  Referring provider: Feroz Keys PA-C  Dx:   Encounter Diagnosis     ICD-10-CM    1  Disc degeneration, lumbar  M51 36    2  Lumbar disc herniation with radiculopathy  M51 16    3  Lumbar radiculopathy  M54 16                   Subjective: Pt reports he is doing well but cont to have L LE weakness  Objective: See treatment diary below      Assessment: Tolerated treatment well  Patient demonstrated fatigue post treatment  Pt with notable slow cautious movements with exercises  Pt completes program and is challenged with L LE weakness  Plan: Continue per plan of care        Precautions: MD protocol for Lumbar decompression surgery     Daily Treatment Diary    HEP: Handout provided and discussed      Manuals 9/2/20 9/3/20 9/8/20 9/10/20 9/14/20 9/17/20       ART x25' x10'           PROM/Stretch             IASTM             STM/Triggerpoint   15' 15' 15' 15'       JM  Prone P to A grade I, II Lumbar x5'                        Neuro Re-Ed             PPU 5x 5x 5x 10x 10x 10x       SHADY x10' x20' 20' 15' 15' 15'       SLB  Next session 3/10  3/10 3/10       Cat Backs  3x10  3/10 3/10 3/10                                              Ther Ex             Prone Hip Ext L LE only 2x10 2x10 2/10 2/10 2/10 2/10       Prone Hip IR 2x10 L2 2x10 L2 2/10 L2 3/10 L2 3/10 L2 3/10 L2       Prone Hip ER 2x10    3/10 3/10       TKEs  3x10 L3 3/10 L3 3/10  L3 3/10 L3 3/10 L3       Bridge with Add Sq  Next session 2/10 2/10 2/10 2/10       Cat and camel     2/10 2/10       SL TS rotation     20xea 20x ea                                 Ther Activity             P-Bars Forward/backward walk  Next session 10 laps 10 laps 10 laps 10 laps       Step ups  Next session 4" 2/10 4" 3/10 6" 3/10 6" 3/10       Lat Walk  2x10 Pbars 2/10 pbars 2/10 // bars 2/10 // bars 2/10 // bars       Hurdles  Next session           Sit to stands  Next session                                                  Gait Training              x10' x10'                        Modalities             MHP  Prone pillows under chest x10' 10' 10' 10' 10'       CP x10' Prone with pilliows under chest            US/Stim

## 2020-09-21 ENCOUNTER — OFFICE VISIT (OUTPATIENT)
Dept: PHYSICAL THERAPY | Facility: CLINIC | Age: 40
End: 2020-09-21
Payer: OTHER MISCELLANEOUS

## 2020-09-21 DIAGNOSIS — M51.36 DISC DEGENERATION, LUMBAR: Primary | ICD-10-CM

## 2020-09-21 DIAGNOSIS — M51.16 LUMBAR DISC HERNIATION WITH RADICULOPATHY: ICD-10-CM

## 2020-09-21 DIAGNOSIS — M54.16 LUMBAR RADICULOPATHY: ICD-10-CM

## 2020-09-21 PROCEDURE — 97110 THERAPEUTIC EXERCISES: CPT

## 2020-09-21 PROCEDURE — 97140 MANUAL THERAPY 1/> REGIONS: CPT

## 2020-09-21 PROCEDURE — 97530 THERAPEUTIC ACTIVITIES: CPT

## 2020-09-21 NOTE — PROGRESS NOTES
Daily Note     Today's date: 2020  Patient name: Isauro Jimenez  : 1980  MRN: [id number]  Referring provider: Wesly Jarquin PA-C  Dx:   Encounter Diagnosis     ICD-10-CM    1  Disc degeneration, lumbar  M51 36    2  Lumbar disc herniation with radiculopathy  M51 16    3  Lumbar radiculopathy  M54 16                   Subjective: pt reports he cont to have discomfort along LS and weakness in L LE  Objective: See treatment diary below      Assessment: Tolerated treatment well  Patient demonstrated fatigue post treatment  Pt cont to merissa program well however cont to have challenged with L LE strength and discomfort along LS  Plan: Continue per plan of care        Precautions: MD protocol for Lumbar decompression surgery     Daily Treatment Diary    HEP: Handout provided and discussed      Manuals 9/2/20 9/3/20 9/8/20 9/10/20 9/14/20 9/17/20 9/21/20      ART x25' x10'           PROM/Stretch             IASTM             STM/Triggerpoint   15' 15' 15' 15' 15'      JM  Prone P to A grade I, II Lumbar x5'                        Neuro Re-Ed             PPU 5x 5x 5x 10x 10x 10x 10x      SHADY x10' x20' 20' 15' 15' 15' 15'      SLB  Next session 3/10  3/10 3/10 3/10      Cat Backs  3x10  3/10 3/10 3/10 3/10                                             Ther Ex             Prone Hip Ext L LE only 2x10 2x10 2/10 2/10 2/10 2/10 2/10       Prone Hip IR 2x10 L2 2x10 L2 2/10 L2 3/10 L2 3/10 L2 3/10 L2 3/10 L2      Prone Hip ER 2x10    3/10 3/10 3/10      TKEs  3x10 L3 3/10 L3 3/10  L3 3/10 L3 3/10 L3 3/10 L3      Bridge with Add Sq  Next session /10 2/10 2/10 2/10 2/10      Cat and camel     2/10 2/10 2/10      SL TS rotation     20xea 20x ea 20x ea                                Ther Activity             P-Bars Forward/backward walk  Next session 10 laps 10 laps 10 laps 10 laps 10 laps      Step ups  Next session 4" 10 4" 3/10 6" 3/10 6" 3/10 6" 3/10      Lat Walk  2x10 Pbars 2/10 pbars 2/10 // bars 2/10 // bars 2/10 // bars 2/10 // bars      Hurdles  Next session           Sit to stands  Next session                                                  Gait Training              x10' x10'                        Modalities             MHP  Prone pillows under chest x10' 10' 10' 10' 10' 10'      CP x10' Prone with pilliows under chest            US/Stim

## 2020-09-22 ENCOUNTER — APPOINTMENT (OUTPATIENT)
Dept: PHYSICAL THERAPY | Facility: CLINIC | Age: 40
End: 2020-09-22
Payer: OTHER MISCELLANEOUS

## 2020-09-22 ENCOUNTER — OFFICE VISIT (OUTPATIENT)
Dept: PHYSICAL THERAPY | Facility: CLINIC | Age: 40
End: 2020-09-22
Payer: OTHER MISCELLANEOUS

## 2020-09-22 DIAGNOSIS — M51.16 LUMBAR DISC HERNIATION WITH RADICULOPATHY: ICD-10-CM

## 2020-09-22 DIAGNOSIS — M51.36 DISC DEGENERATION, LUMBAR: Primary | ICD-10-CM

## 2020-09-22 DIAGNOSIS — M54.16 LUMBAR RADICULOPATHY: ICD-10-CM

## 2020-09-22 PROCEDURE — 97140 MANUAL THERAPY 1/> REGIONS: CPT

## 2020-09-22 PROCEDURE — 97530 THERAPEUTIC ACTIVITIES: CPT

## 2020-09-22 PROCEDURE — 97110 THERAPEUTIC EXERCISES: CPT

## 2020-09-22 NOTE — PROGRESS NOTES
Daily Note     Today's date: 2020  Patient name: Franck Bhardwaj  : 1980  MRN: [id number]  Referring provider: Lucita Al PA-C  Dx:   Encounter Diagnosis     ICD-10-CM    1  Disc degeneration, lumbar  M51 36    2  Lumbar disc herniation with radiculopathy  M51 16    3  Lumbar radiculopathy  M54 16                   Subjective: Pt reports he is doing well but cont to have mm spasm in L LE and weakness  Objective: See treatment diary below      Assessment: Tolerated treatment well  Patient demonstrated fatigue post treatment  Pt cont to have notable weakness in L LE as demonstrated by current program  Pt with min c/o pain   Plan: Continue per plan of care        Precautions: MD protocol for Lumbar decompression surgery     Daily Treatment Diary    HEP: Handout provided and discussed      Manuals 9/2/20 9/3/20 9/8/20 9/10/20 9/14/20 9/17/20 9/21/20 9/22/20     ART x25' x10'           PROM/Stretch             IASTM             STM/Triggerpoint   15' 15' 15' 15' 15' 15'     JM  Prone P to A grade I, II Lumbar x5'                        Neuro Re-Ed             PPU 5x 5x 5x 10x 10x 10x 10x 10x     SHADY x10' x20' 20' 15' 15' 15' 15' 15'     SLB  Next session 3/10  3/10 3/10 3/10 3/10     Cat Backs  3x10  3/10 3/10 3/10 3/10 3/10                                            Ther Ex             Prone Hip Ext L LE only 2x10 2x10 2/10 2/10 2/10 2/10 2/10  2/10     Prone Hip IR 2x10 L2 2x10 L2 2/10 L2 3/10 L2 3/10 L2 3/10 L2 3/10 L2 3/10 L2     Prone Hip ER 2x10    3/10 3/10 3/10 3/10     TKEs  3x10 L3 3/10 L3 3/10  L3 3/10 L3 3/10 L3 3/10 L3 3/10 L3     Bridge with Add Sq  Next session 2/10 2/10 2/10 2/10 2/10 2/10     Cat and camel     2/10 2/10 2/10 2/10      SL TS rotation     20xea 20x ea 20x ea 20 ea                               Ther Activity             P-Bars Forward/backward walk  Next session 10 laps 10 laps 10 laps 10 laps 10 laps 10 laps     Step ups  Next session 4" 2/10 4" 3/10 6" 3/10 6" 3/10 6" 3/10 6" 3/10     Lat Walk  2x10 Pbars 2/10 pbars 2/10 // bars 2/10 // bars 2/10 // bars 2/10 // bars 2/10 // bars     Hurdles  Next session           Sit to stands  Next session                                                  Gait Training              x10' x10'                        Modalities             MHP  Prone pillows under chest x10' 10' 10' 10' 10' 10' 10'      CP x10' Prone with pilliows under chest            US/Stim

## 2020-09-24 ENCOUNTER — OFFICE VISIT (OUTPATIENT)
Dept: PHYSICAL THERAPY | Facility: CLINIC | Age: 40
End: 2020-09-24
Payer: OTHER MISCELLANEOUS

## 2020-09-24 DIAGNOSIS — M51.36 DISC DEGENERATION, LUMBAR: Primary | ICD-10-CM

## 2020-09-24 DIAGNOSIS — M51.16 LUMBAR DISC HERNIATION WITH RADICULOPATHY: ICD-10-CM

## 2020-09-24 DIAGNOSIS — M54.16 LUMBAR RADICULOPATHY: ICD-10-CM

## 2020-09-24 PROCEDURE — 97110 THERAPEUTIC EXERCISES: CPT

## 2020-09-24 PROCEDURE — 97530 THERAPEUTIC ACTIVITIES: CPT

## 2020-09-24 PROCEDURE — 97140 MANUAL THERAPY 1/> REGIONS: CPT

## 2020-09-24 NOTE — PROGRESS NOTES
Daily Note     Today's date: 2020  Patient name: Cesilia Valenzuela  : 1980  MRN: [id number]  Referring provider: Ruth Addison PA-C  Dx:   Encounter Diagnosis     ICD-10-CM    1  Disc degeneration, lumbar  M51 36    2  Lumbar disc herniation with radiculopathy  M51 16    3  Lumbar radiculopathy  M54 16                   Subjective: Pt reports he is doing well but cont to have weakness in L LE  Objective: See treatment diary below      Assessment: Tolerated treatment well  Patient demonstrated fatigue post treatment  Pt cont to merissa program well with min pain  Pt c/o weakness in L LE with activity  Plan: Continue per plan of care        Precautions: MD protocol for Lumbar decompression surgery     Daily Treatment Diary    HEP: Handout provided and discussed      Manuals 9/2/20 9/3/20 9/8/20 9/10/20 9/14/20 9/17/20 9/21/20 9/22/20 9/24/20    ART x25' x10'           PROM/Stretch             IASTM             STM/Triggerpoint   15' 15' 15' 15' 15' 15' 15'    JM  Prone P to A grade I, II Lumbar x5'                        Neuro Re-Ed             PPU 5x 5x 5x 10x 10x 10x 10x 10x 10x    SHADY x10' x20' 20' 15' 15' 15' 15' 15' 15'    SLB  Next session 3/10  3/10 3/10 3/10 3/10 3/10    Cat Backs  3x10  3/10 3/10 3/10 3/10 3/10 3/10                                           Ther Ex             Prone Hip Ext L LE only 2x10 2x10 2/10 2/10 2/10 2/10 2/10  2/10 2/10    Prone Hip IR 2x10 L2 2x10 L2 2/10 L2 3/10 L2 3/10 L2 3/10 L2 3/10 L2 3/10 L2 3/10 L2    Prone Hip ER 2x10    3/10 3/10 3/10 3/10 3/10    TKEs  3x10 L3 3/10 L3 3/10  L3 3/10 L3 3/10 L3 3/10 L3 3/10 L3 3/10 L3    Bridge with Add Sq  Next session 2/10 2/10 2/10 2/10 2/10 2/10 2/10    Cat and camel     2/10 2/10 2/10 2/10  2/10    SL TS rotation     20xea 20x ea 20x ea 20 ea 20 ea                              Ther Activity             P-Bars Forward/backward walk  Next session 10 laps 10 laps 10 laps 10 laps 10 laps 10 laps 10 laps    Step ups  Next session 4" 2/10 4" 3/10 6" 3/10 6" 3/10 6" 3/10 6" 3/10 6" 3/10    Lat Walk  2x10 Pbars 2/10 pbars 2/10 // bars 2/10 // bars 2/10 // bars 2/10 // bars 2/10 // bars 2/10 // bars    Hurdles  Next session           Sit to stands  Next session                                                  Gait Training              x10' x10'                        Modalities             MHP  Prone pillows under chest x10' 10' 10' 10' 10' 10' 10'  10'    CP x10' Prone with pilliows under chest            US/Stim

## 2020-09-28 ENCOUNTER — TRANSCRIBE ORDERS (OUTPATIENT)
Dept: PHYSICAL THERAPY | Facility: CLINIC | Age: 40
End: 2020-09-28

## 2020-09-28 ENCOUNTER — OFFICE VISIT (OUTPATIENT)
Dept: PHYSICAL THERAPY | Facility: CLINIC | Age: 40
End: 2020-09-28
Payer: OTHER MISCELLANEOUS

## 2020-09-28 DIAGNOSIS — M51.36 DISC DEGENERATION, LUMBAR: Primary | ICD-10-CM

## 2020-09-28 DIAGNOSIS — M51.36 DEGENERATION OF LUMBAR INTERVERTEBRAL DISC: Primary | ICD-10-CM

## 2020-09-28 DIAGNOSIS — M54.16 LUMBAR RADICULOPATHY: ICD-10-CM

## 2020-09-28 DIAGNOSIS — M51.16 LUMBAR DISC HERNIATION WITH RADICULOPATHY: ICD-10-CM

## 2020-09-28 PROCEDURE — 97140 MANUAL THERAPY 1/> REGIONS: CPT | Performed by: PHYSICAL THERAPIST

## 2020-09-28 PROCEDURE — 97112 NEUROMUSCULAR REEDUCATION: CPT | Performed by: PHYSICAL THERAPIST

## 2020-09-28 PROCEDURE — 97110 THERAPEUTIC EXERCISES: CPT | Performed by: PHYSICAL THERAPIST

## 2020-09-28 NOTE — LETTER
2020    EDGAR [address]    Patient: Farheen Fontenot   YOB: 1980   Date of Visit: 2020     Encounter Diagnosis     ICD-10-CM    1  Disc degeneration, lumbar  M51 36    2  Lumbar disc herniation with radiculopathy  M51 16    3  Lumbar radiculopathy  M54 16        Dear Dr Tristian Archibald:    Thank you for your recent referral of Farheen Fontenot  Please review the attached evaluation summary from Tremaine's recent visit  Please verify that you agree with the plan of care by signing the attached order  If you have any questions or concerns, please do not hesitate to call  I sincerely appreciate the opportunity to share in the care of one of your patients and hope to have another opportunity to work with you in the near future  Sincerely,    Lori Stephen, PT, DPT, ATC, ART      Referring Provider:      I certify that I have read the below Plan of Care and certify the need for these services furnished under this plan of treatment while under my care  Quan Hampton PA-C  [address] [place] [address]: [phone]          PT Re-Evaluation     Today's date: 2020  Patient name: Farheen Fontenot  : 1980  MRN: [id number]  Referring provider: Gautam Roberts PA-C  Dx:   Encounter Diagnosis     ICD-10-CM    1  Disc degeneration, lumbar  M51 36    2  Lumbar disc herniation with radiculopathy  M51 16    3   Lumbar radiculopathy  M54 16                   Assessment  Understanding of Dx/Px/POC: good   Prognosis: good    Goals  STGs: To be complete within 4 weeks (Partially Met)  - Decrease/centralize pain to < 2/10 at worst  - Increase lumbar extension ROM to WNL  - Increase bilateral hip flexor flexibility to WNL  - Increase core stability and posterior lateral LE chain strength to > 4/5  - Improve postural awareness capacity to > 60min before deficit    LTGs: To be complete within 6 weeks  - Able to sit for any extended amount of time without pain or limitation for increased safety and functional capacity with ADLs and work-related duty  - Able to complete all standing activity without pain or limitation for increased safety and functional capacity with ADLs and work-related duty  - Able to complete all walking activity for any extended amount of time/distance without pain or limitation for increased safety and functional capacity with ADLs and work-related duty  - Able to repetitively bend over at trunk without pain or limitation for increased safety and functional capacity with ADLs and and work-related duty  - Able to complete all lifting/carrying activity without pain or limitation for increased safety and functional capacity with ADLs and work-related duty  - Able to complete transfers repetitively without pain or limitation for increased safety and functional capacity with ADLs and work-related duty    Plan  Planned therapy interventions: manual therapy, neuromuscular re-education, patient education, self care, therapeutic exercise and therapeutic activities  Frequency: 3x week  Duration in weeks: 4       Upon completion of today's re-evaluation, as evidenced by present objective and subjective measures, Latisha Antunez' sx remain consistent with continued, slow-paced progress from being < 3 months s/p lumbar decompression surgery   Surgery is a result of an acute tripping and twisting incident that happened at work [place]   Pt will benefit from continued skilled physical therapy to address current deficits  Subjective Evaluation    Pain  Current pain ratin  At best pain ratin  At worst pain ratin  Location: Lumbar and L LE radciualr sx    Patient Goals  Patient goals for therapy: increased strength, decreased pain and increased motion         Pt reports thus far only mild improvements in overall pain and functional status   Pt reports he continues to attempt HEP, as well as attempt to walk as normal as possible with SPC  Pt reports L LE weakness continues to negatively impact overall walking capacity  So far not much change, but hoping as he continues to progress with physical therapy, he will continue to see a a decrease in pain, as well as an increase in strength and stability          Objective Pain level ranges 1-7/10  AROM: Lumbar extension 50%, flexion 75%, rot 50%; L Hip Extension 50%, IR 50%  Strength: Core stability and L LE post/lat LE chain strength 3+/5  Postural Awareness: Poor (flexed trunk, Ant pelvic tilt)  Gait: SPC, Moderate lateral limp/trendelenberg pattern favoring L LE  Hip flexor flexibility: (+) Sandor Test; Mod to severe shortening L Hip flexor; R Mild shortening  FOTO: Next session; GOAL 53  Sensation: Remains diminished posteriorly below knee (L4, L5, S1, S2 dermatomes)  Unable sit without pain and limitation  Unable to stand without pain and limitation  Unable to walk without pain and limitation  Unable to bend over at trunk without pain and limitation  Unable to complete lifting/carrying activity without pain and limitation  Unable to complete transfers without pain and limitation             Precautions: MD protocol for Lumbar decompression surgery July, 2020    Daily Treatment Diary    HEP: Handout provided and discussed      Manuals 9/28/20 9/3/20 9/8/20 9/10/20 9/14/20 9/17/20 9/21/20 9/22/20 9/24/20    ART  x10'           PROM/Stretch             IASTM             STM/Triggerpoint 15'  15' 15' 15' 15' 15' 15' 15'    JM  Prone P to A grade I, II Lumbar x5'                        Neuro Re-Ed             PPU 10x 5x 5x 10x 10x 10x 10x 10x 10x    SHADY x15' x20' 20' 15' 15' 15' 15' 15' 15'    SLB  Next session 3/10  3/10 3/10 3/10 3/10 3/10    Cat Backs  3x10  3/10 3/10 3/10 3/10 3/10 3/10                                           Ther Ex             Prone Hip Ext L LE only 2x10 2x10 2/10 2/10 2/10 2/10 2/10  2/10 2/10    Prone Hip IR 3x10 L2 2x10 L2 2/10 L2 3/10 L2 3/10 L2 3/10 L2 3/10 L2 3/10 L2 3/10 L2    Prone Hip ER 3x10    3/10 3/10 3/10 3/10 3/10    TKEs 3x10 L3 3x10 L3 3/10 L3 3/10  L3 3/10 L3 3/10 L3 3/10 L3 3/10 L3 3/10 L3    Bridge with Add Sq 2x10 Next session 2/10 2/10 2/10 2/10 2/10 2/10 2/10    Cat and camel     2/10 2/10 2/10 2/10  2/10    SL TS rotation     20xea 20x ea 20x ea 20 ea 20 ea                              Ther Activity             P-Bars Forward/backward walk 10 Laps Next session 10 laps 10 laps 10 laps 10 laps 10 laps 10 laps 10 laps    Step ups 6'' 3x10 Next session 4" 2/10 4" 3/10 6" 3/10 6" 3/10 6" 3/10 6" 3/10 6" 3/10    Lat Walk 2x10 Pbars 2x10 Pbars 2/10 pbars 2/10 // bars 2/10 // bars 2/10 // bars 2/10 // bars 2/10 // bars 2/10 // bars    Hurdles             Sit to stands                                                    Gait Training               10'                        Modalities             MHP 10' Prone pillows under chest x10' 10' 10' 10' 10' 10' 10'  10'    CP             US/Stim

## 2020-09-28 NOTE — PROGRESS NOTES
PT Re-Evaluation     Today's date: 2020  Patient name: Mayco Adames  : 1980  MRN: [id number]  Referring provider: Sharla Ruvalcaba PA-C  Dx:   Encounter Diagnosis     ICD-10-CM    1  Disc degeneration, lumbar  M51 36    2  Lumbar disc herniation with radiculopathy  M51 16    3   Lumbar radiculopathy  M54 16                   Assessment  Understanding of Dx/Px/POC: good   Prognosis: good    Goals  STGs: To be complete within 4 weeks (Partially Met)  - Decrease/centralize pain to < 2/10 at worst  - Increase lumbar extension ROM to WNL  - Increase bilateral hip flexor flexibility to WNL  - Increase core stability and posterior lateral LE chain strength to > 4/5  - Improve postural awareness capacity to > 60min before deficit    LTGs: To be complete within 6 weeks  - Able to sit for any extended amount of time without pain or limitation for increased safety and functional capacity with ADLs and work-related duty  - Able to complete all standing activity without pain or limitation for increased safety and functional capacity with ADLs and work-related duty  - Able to complete all walking activity for any extended amount of time/distance without pain or limitation for increased safety and functional capacity with ADLs and work-related duty  - Able to repetitively bend over at trunk without pain or limitation for increased safety and functional capacity with ADLs and and work-related duty  - Able to complete all lifting/carrying activity without pain or limitation for increased safety and functional capacity with ADLs and work-related duty  - Able to complete transfers repetitively without pain or limitation for increased safety and functional capacity with ADLs and work-related duty    Plan  Planned therapy interventions: manual therapy, neuromuscular re-education, patient education, self care, therapeutic exercise and therapeutic activities  Frequency: 3x week  Duration in weeks: 4       Upon completion of today's re-evaluation, as evidenced by present objective and subjective measures, Reg Pearson' sx remain consistent with continued, slow-paced progress from being < 3 months s/p lumbar decompression surgery   Surgery is a result of an acute tripping and twisting incident that happened at work [place]   Pt will benefit from continued skilled physical therapy to address current deficits  Subjective Evaluation    Pain  Current pain ratin  At best pain ratin  At worst pain ratin  Location: Lumbar and L LE radciualr sx    Patient Goals  Patient goals for therapy: increased strength, decreased pain and increased motion         Pt reports thus far only mild improvements in overall pain and functional status  Pt reports he continues to attempt HEP, as well as attempt to walk as normal as possible with SPC  Pt reports L LE weakness continues to negatively impact overall walking capacity  So far not much change, but hoping as he continues to progress with physical therapy, he will continue to see a a decrease in pain, as well as an increase in strength and stability          Objective Pain level ranges 1-7/10  AROM: Lumbar extension 50%, flexion 75%, rot 50%; L Hip Extension 50%, IR 50%  Strength: Core stability and L LE post/lat LE chain strength 3+/5  Postural Awareness: Poor (flexed trunk, Ant pelvic tilt)  Gait: SPC, Moderate lateral limp/trendelenberg pattern favoring L LE  Hip flexor flexibility: (+) Sandor Test; Mod to severe shortening L Hip flexor; R Mild shortening  FOTO: Next session; GOAL 53  Sensation: Remains diminished posteriorly below knee (L4, L5, S1, S2 dermatomes)  Unable sit without pain and limitation  Unable to stand without pain and limitation  Unable to walk without pain and limitation  Unable to bend over at trunk without pain and limitation  Unable to complete lifting/carrying activity without pain and limitation  Unable to complete transfers without pain and limitation             Precautions: MD protocol for Lumbar decompression surgery July, 2020    Daily Treatment Diary    HEP: Handout provided and discussed      Manuals 9/28/20 9/3/20 9/8/20 9/10/20 9/14/20 9/17/20 9/21/20 9/22/20 9/24/20    ART  x10'           PROM/Stretch             IASTM             STM/Triggerpoint 15'  15' 15' 15' 15' 15' 15' 15'    JM  Prone P to A grade I, II Lumbar x5'                        Neuro Re-Ed             PPU 10x 5x 5x 10x 10x 10x 10x 10x 10x    SHADY x15' x20' 20' 15' 15' 15' 15' 15' 15'    SLB  Next session 3/10  3/10 3/10 3/10 3/10 3/10    Cat Backs  3x10  3/10 3/10 3/10 3/10 3/10 3/10                                           Ther Ex             Prone Hip Ext L LE only 2x10 2x10 2/10 2/10 2/10 2/10 2/10  2/10 2/10    Prone Hip IR 3x10 L2 2x10 L2 2/10 L2 3/10 L2 3/10 L2 3/10 L2 3/10 L2 3/10 L2 3/10 L2    Prone Hip ER 3x10    3/10 3/10 3/10 3/10 3/10    TKEs 3x10 L3 3x10 L3 3/10 L3 3/10  L3 3/10 L3 3/10 L3 3/10 L3 3/10 L3 3/10 L3    Bridge with Add Sq 2x10 Next session 2/10 2/10 2/10 2/10 2/10 2/10 2/10    Cat and camel     2/10 2/10 2/10 2/10  2/10    SL TS rotation     20xea 20x ea 20x ea 20 ea 20 ea                              Ther Activity             P-Bars Forward/backward walk 10 Laps Next session 10 laps 10 laps 10 laps 10 laps 10 laps 10 laps 10 laps    Step ups 6'' 3x10 Next session 4" 2/10 4" 3/10 6" 3/10 6" 3/10 6" 3/10 6" 3/10 6" 3/10    Lat Walk 2x10 Pbars 2x10 Pbars 2/10 pbars 2/10 // bars 2/10 // bars 2/10 // bars 2/10 // bars 2/10 // bars 2/10 // bars    Hurdles             Sit to stands                                                    Gait Training               10'                        Modalities             MHP 10' Prone pillows under chest x10' 10' 10' 10' 10' 10' 10'  10'    CP             US/Stim

## 2020-09-29 ENCOUNTER — OFFICE VISIT (OUTPATIENT)
Dept: PHYSICAL THERAPY | Facility: CLINIC | Age: 40
End: 2020-09-29
Payer: OTHER MISCELLANEOUS

## 2020-09-29 DIAGNOSIS — M54.16 LUMBAR RADICULOPATHY: ICD-10-CM

## 2020-09-29 DIAGNOSIS — M51.36 DISC DEGENERATION, LUMBAR: Primary | ICD-10-CM

## 2020-09-29 DIAGNOSIS — M51.16 LUMBAR DISC HERNIATION WITH RADICULOPATHY: ICD-10-CM

## 2020-09-29 PROCEDURE — 97112 NEUROMUSCULAR REEDUCATION: CPT

## 2020-09-29 PROCEDURE — 97530 THERAPEUTIC ACTIVITIES: CPT

## 2020-09-29 PROCEDURE — 97140 MANUAL THERAPY 1/> REGIONS: CPT

## 2020-09-29 PROCEDURE — 97110 THERAPEUTIC EXERCISES: CPT

## 2020-09-29 NOTE — PROGRESS NOTES
Daily Note     Today's date: 2020  Patient name: Isauro Jimenez  : 1980  MRN: [id number]  Referring provider: Wesly Jarquin PA-C  Dx:   Encounter Diagnosis     ICD-10-CM    1  Disc degeneration, lumbar  M51 36    2  Lumbar disc herniation with radiculopathy  M51 16    3  Lumbar radiculopathy  M54 16                   Subjective: Pt reports he is doing well with min c/o pain in LS  Reports cont weakness in L LE  Objective: See treatment diary below      Assessment: Tolerated treatment well  Patient demonstrated fatigue post treatment  Pt cont to merissa program well with min increased pain along LS  Pt however cont to have slow cautious gait with c/o L LE weakness  Plan: Continue per plan of care          Precautions: MD protocol for Lumbar decompression surgery     Daily Treatment Diary    HEP: Handout provided and discussed      Manuals 9/28/20 9/29/20 9/8/20 9/10/20 9/14/20 9/17/20 9/21/20 9/22/20 9/24/20    ART             PROM/Stretch             IASTM             STM/Triggerpoint 15' 15' 15' 15' 15' 15' 15' 15' 15'    JM                          Neuro Re-Ed             PPU 10x 2/10 5x 10x 10x 10x 10x 10x 10x    SHADY x15' x20' 20' 15' 15' 15' 15' 15' 15'    SLB  3/10 3/10  3/10 3/10 3/10 3/10 3/10    Cat Backs  3x10  3/10 3/10 3/10 3/10 3/10 3/10                                           Ther Ex             Prone Hip Ext L LE only 2x10 2x10 2/10 2/10 2/10 2/10 2/10  2/10 2/10    Prone Hip IR 3x10 L2 2x10 L2 2/10 L2 3/10 L2 3/10 L2 3/10 L2 3/10 L2 3/10 L2 3/10 L2    Prone Hip ER 3x10    3/10 3/10 3/10 3/10 3/10    TKEs 3x10 L3 3x10 L3 3/10 L3 3/10  L3 3/10 L3 3/10 L3 3/10 L3 3/10 L3 3/10 L3    Bridge with Add Sq 2x10 2/10 2/10 2/10 2/10 2/10 2/10 2/10 2/10    Cat and camel  2/10   2/10 2/10 2/10 2/10  2/10    SL TS rotation  20xea   20xea 20x ea 20x ea 20 ea 20 ea                              Ther Activity             P-Bars Forward/backward walk 10 Laps  10 laps 10 laps 10 laps 10 laps 10 laps 10 laps 10 laps    Step ups 6'' 3x10 6" 3/10 4" 2/10 4" 3/10 6" 3/10 6" 3/10 6" 3/10 6" 3/10 6" 3/10    Lat Walk 2x10 Pbars 2x10 Pbars 2/10 pbars 2/10 // bars 2/10 // bars 2/10 // bars 2/10 // bars 2/10 // bars 2/10 // bars    Hurdles             Sit to stands             TM  6'                                     Gait Training                                       Modalities             MHP 10' 10' 10' 10' 10' 10' 10' 10'  10'    CP             US/Stim

## 2020-10-01 ENCOUNTER — OFFICE VISIT (OUTPATIENT)
Dept: PHYSICAL THERAPY | Facility: CLINIC | Age: 40
End: 2020-10-01
Payer: OTHER MISCELLANEOUS

## 2020-10-01 DIAGNOSIS — M54.16 LUMBAR RADICULOPATHY: ICD-10-CM

## 2020-10-01 DIAGNOSIS — M51.36 DISC DEGENERATION, LUMBAR: Primary | ICD-10-CM

## 2020-10-01 DIAGNOSIS — M51.16 LUMBAR DISC HERNIATION WITH RADICULOPATHY: ICD-10-CM

## 2020-10-01 PROCEDURE — 97110 THERAPEUTIC EXERCISES: CPT

## 2020-10-01 PROCEDURE — 97530 THERAPEUTIC ACTIVITIES: CPT

## 2020-10-01 PROCEDURE — 97140 MANUAL THERAPY 1/> REGIONS: CPT

## 2020-10-05 ENCOUNTER — OFFICE VISIT (OUTPATIENT)
Dept: PHYSICAL THERAPY | Facility: CLINIC | Age: 40
End: 2020-10-05
Payer: OTHER MISCELLANEOUS

## 2020-10-05 DIAGNOSIS — M54.16 LUMBAR RADICULOPATHY: ICD-10-CM

## 2020-10-05 DIAGNOSIS — M51.16 LUMBAR DISC HERNIATION WITH RADICULOPATHY: ICD-10-CM

## 2020-10-05 DIAGNOSIS — M51.36 DISC DEGENERATION, LUMBAR: Primary | ICD-10-CM

## 2020-10-05 PROCEDURE — 97530 THERAPEUTIC ACTIVITIES: CPT

## 2020-10-05 PROCEDURE — 97140 MANUAL THERAPY 1/> REGIONS: CPT

## 2020-10-05 PROCEDURE — 97110 THERAPEUTIC EXERCISES: CPT

## 2020-10-06 ENCOUNTER — OFFICE VISIT (OUTPATIENT)
Dept: PHYSICAL THERAPY | Facility: CLINIC | Age: 40
End: 2020-10-06
Payer: OTHER MISCELLANEOUS

## 2020-10-06 DIAGNOSIS — M51.16 LUMBAR DISC HERNIATION WITH RADICULOPATHY: ICD-10-CM

## 2020-10-06 DIAGNOSIS — M54.16 LUMBAR RADICULOPATHY: ICD-10-CM

## 2020-10-06 DIAGNOSIS — M51.36 DISC DEGENERATION, LUMBAR: Primary | ICD-10-CM

## 2020-10-06 PROCEDURE — 97530 THERAPEUTIC ACTIVITIES: CPT

## 2020-10-06 PROCEDURE — 97140 MANUAL THERAPY 1/> REGIONS: CPT

## 2020-10-06 PROCEDURE — 97110 THERAPEUTIC EXERCISES: CPT

## 2020-10-08 ENCOUNTER — OFFICE VISIT (OUTPATIENT)
Dept: PHYSICAL THERAPY | Facility: CLINIC | Age: 40
End: 2020-10-08
Payer: OTHER MISCELLANEOUS

## 2020-10-08 DIAGNOSIS — M54.16 LUMBAR RADICULOPATHY: ICD-10-CM

## 2020-10-08 DIAGNOSIS — M51.16 LUMBAR DISC HERNIATION WITH RADICULOPATHY: ICD-10-CM

## 2020-10-08 DIAGNOSIS — M51.36 DISC DEGENERATION, LUMBAR: Primary | ICD-10-CM

## 2020-10-08 PROCEDURE — 97530 THERAPEUTIC ACTIVITIES: CPT

## 2020-10-08 PROCEDURE — 97140 MANUAL THERAPY 1/> REGIONS: CPT

## 2020-10-08 PROCEDURE — 97110 THERAPEUTIC EXERCISES: CPT

## 2020-10-12 ENCOUNTER — APPOINTMENT (OUTPATIENT)
Dept: PHYSICAL THERAPY | Facility: CLINIC | Age: 40
End: 2020-10-12
Payer: OTHER MISCELLANEOUS

## 2020-10-13 ENCOUNTER — TRANSCRIBE ORDERS (OUTPATIENT)
Dept: PHYSICAL THERAPY | Facility: CLINIC | Age: 40
End: 2020-10-13

## 2020-10-13 ENCOUNTER — OFFICE VISIT (OUTPATIENT)
Dept: PHYSICAL THERAPY | Facility: CLINIC | Age: 40
End: 2020-10-13
Payer: OTHER MISCELLANEOUS

## 2020-10-13 DIAGNOSIS — M51.36 DISC DEGENERATION, LUMBAR: Primary | ICD-10-CM

## 2020-10-13 DIAGNOSIS — M51.16 LUMBAR DISC HERNIATION WITH RADICULOPATHY: ICD-10-CM

## 2020-10-13 DIAGNOSIS — M54.16 LUMBAR RADICULOPATHY: ICD-10-CM

## 2020-10-13 PROCEDURE — 97530 THERAPEUTIC ACTIVITIES: CPT

## 2020-10-13 PROCEDURE — 97140 MANUAL THERAPY 1/> REGIONS: CPT

## 2020-10-13 PROCEDURE — 97110 THERAPEUTIC EXERCISES: CPT

## 2020-10-15 ENCOUNTER — OFFICE VISIT (OUTPATIENT)
Dept: PHYSICAL THERAPY | Facility: CLINIC | Age: 40
End: 2020-10-15
Payer: OTHER MISCELLANEOUS

## 2020-10-15 DIAGNOSIS — M54.16 LUMBAR RADICULOPATHY: ICD-10-CM

## 2020-10-15 DIAGNOSIS — M51.36 DISC DEGENERATION, LUMBAR: Primary | ICD-10-CM

## 2020-10-15 DIAGNOSIS — M51.16 LUMBAR DISC HERNIATION WITH RADICULOPATHY: ICD-10-CM

## 2020-10-15 PROCEDURE — 97110 THERAPEUTIC EXERCISES: CPT

## 2020-10-15 PROCEDURE — 97140 MANUAL THERAPY 1/> REGIONS: CPT

## 2020-10-15 PROCEDURE — 97530 THERAPEUTIC ACTIVITIES: CPT

## 2020-10-19 ENCOUNTER — OFFICE VISIT (OUTPATIENT)
Dept: PHYSICAL THERAPY | Facility: CLINIC | Age: 40
End: 2020-10-19
Payer: OTHER MISCELLANEOUS

## 2020-10-19 DIAGNOSIS — M51.16 LUMBAR DISC HERNIATION WITH RADICULOPATHY: ICD-10-CM

## 2020-10-19 DIAGNOSIS — M54.16 LUMBAR RADICULOPATHY: ICD-10-CM

## 2020-10-19 DIAGNOSIS — M51.36 DISC DEGENERATION, LUMBAR: Primary | ICD-10-CM

## 2020-10-19 PROCEDURE — 97110 THERAPEUTIC EXERCISES: CPT

## 2020-10-19 PROCEDURE — 97140 MANUAL THERAPY 1/> REGIONS: CPT

## 2020-10-19 PROCEDURE — 97530 THERAPEUTIC ACTIVITIES: CPT

## 2020-10-20 ENCOUNTER — OFFICE VISIT (OUTPATIENT)
Dept: PHYSICAL THERAPY | Facility: CLINIC | Age: 40
End: 2020-10-20
Payer: OTHER MISCELLANEOUS

## 2020-10-20 DIAGNOSIS — M51.16 LUMBAR DISC HERNIATION WITH RADICULOPATHY: ICD-10-CM

## 2020-10-20 DIAGNOSIS — M54.16 LUMBAR RADICULOPATHY: ICD-10-CM

## 2020-10-20 DIAGNOSIS — M51.36 DISC DEGENERATION, LUMBAR: Primary | ICD-10-CM

## 2020-10-20 PROCEDURE — 97530 THERAPEUTIC ACTIVITIES: CPT

## 2020-10-20 PROCEDURE — 97110 THERAPEUTIC EXERCISES: CPT

## 2020-10-20 PROCEDURE — 97140 MANUAL THERAPY 1/> REGIONS: CPT

## 2020-10-22 ENCOUNTER — OFFICE VISIT (OUTPATIENT)
Dept: PHYSICAL THERAPY | Facility: CLINIC | Age: 40
End: 2020-10-22
Payer: OTHER MISCELLANEOUS

## 2020-10-22 DIAGNOSIS — M51.16 LUMBAR DISC HERNIATION WITH RADICULOPATHY: ICD-10-CM

## 2020-10-22 DIAGNOSIS — M51.36 DISC DEGENERATION, LUMBAR: Primary | ICD-10-CM

## 2020-10-22 DIAGNOSIS — M54.16 LUMBAR RADICULOPATHY: ICD-10-CM

## 2020-10-22 PROCEDURE — 97110 THERAPEUTIC EXERCISES: CPT

## 2020-10-22 PROCEDURE — 97140 MANUAL THERAPY 1/> REGIONS: CPT

## 2020-10-22 PROCEDURE — 97530 THERAPEUTIC ACTIVITIES: CPT

## 2020-10-26 ENCOUNTER — TRANSCRIBE ORDERS (OUTPATIENT)
Dept: PHYSICAL THERAPY | Facility: CLINIC | Age: 40
End: 2020-10-26

## 2020-10-26 ENCOUNTER — OFFICE VISIT (OUTPATIENT)
Dept: PHYSICAL THERAPY | Facility: CLINIC | Age: 40
End: 2020-10-26
Payer: OTHER MISCELLANEOUS

## 2020-10-26 DIAGNOSIS — M54.16 LUMBAR RADICULOPATHY: ICD-10-CM

## 2020-10-26 DIAGNOSIS — M51.36 DISC DEGENERATION, LUMBAR: Primary | ICD-10-CM

## 2020-10-26 DIAGNOSIS — M51.16 LUMBAR DISC HERNIATION WITH RADICULOPATHY: ICD-10-CM

## 2020-10-26 DIAGNOSIS — M51.36 DEGENERATION OF LUMBAR INTERVERTEBRAL DISC: Primary | ICD-10-CM

## 2020-10-26 PROCEDURE — 97110 THERAPEUTIC EXERCISES: CPT | Performed by: PHYSICAL THERAPIST

## 2020-10-26 PROCEDURE — 97140 MANUAL THERAPY 1/> REGIONS: CPT | Performed by: PHYSICAL THERAPIST

## 2020-10-26 PROCEDURE — 97112 NEUROMUSCULAR REEDUCATION: CPT | Performed by: PHYSICAL THERAPIST

## 2020-10-27 ENCOUNTER — APPOINTMENT (OUTPATIENT)
Dept: PHYSICAL THERAPY | Facility: CLINIC | Age: 40
End: 2020-10-27
Payer: OTHER MISCELLANEOUS

## 2020-10-29 ENCOUNTER — OFFICE VISIT (OUTPATIENT)
Dept: PHYSICAL THERAPY | Facility: CLINIC | Age: 40
End: 2020-10-29
Payer: OTHER MISCELLANEOUS

## 2020-10-29 DIAGNOSIS — M51.36 DISC DEGENERATION, LUMBAR: Primary | ICD-10-CM

## 2020-10-29 DIAGNOSIS — M54.16 LUMBAR RADICULOPATHY: ICD-10-CM

## 2020-10-29 DIAGNOSIS — M51.16 LUMBAR DISC HERNIATION WITH RADICULOPATHY: ICD-10-CM

## 2020-10-29 PROCEDURE — 97140 MANUAL THERAPY 1/> REGIONS: CPT

## 2020-10-29 PROCEDURE — 97110 THERAPEUTIC EXERCISES: CPT

## 2020-10-29 PROCEDURE — 97530 THERAPEUTIC ACTIVITIES: CPT

## 2020-10-31 ENCOUNTER — APPOINTMENT (EMERGENCY)
Dept: RADIOLOGY | Facility: HOSPITAL | Age: 40
End: 2020-10-31
Payer: COMMERCIAL

## 2020-10-31 ENCOUNTER — HOSPITAL ENCOUNTER (EMERGENCY)
Facility: HOSPITAL | Age: 40
Discharge: HOME | End: 2020-10-31
Attending: EMERGENCY MEDICINE | Admitting: EMERGENCY MEDICINE
Payer: COMMERCIAL

## 2020-10-31 VITALS
HEART RATE: 109 BPM | DIASTOLIC BLOOD PRESSURE: 98 MMHG | WEIGHT: 178.57 LBS | BODY MASS INDEX: 29.75 KG/M2 | HEIGHT: 65 IN | RESPIRATION RATE: 18 BRPM | TEMPERATURE: 98.1 F | SYSTOLIC BLOOD PRESSURE: 177 MMHG | OXYGEN SATURATION: 99 %

## 2020-10-31 DIAGNOSIS — M25.512 LEFT SHOULDER PAIN: ICD-10-CM

## 2020-10-31 DIAGNOSIS — S46.812A STRAIN OF LEFT TRAPEZIUS MUSCLE, INITIAL ENCOUNTER: Primary | ICD-10-CM

## 2020-10-31 PROCEDURE — 73030 X-RAY EXAM OF SHOULDER: CPT

## 2020-10-31 PROCEDURE — 99283 EMERGENCY DEPT VISIT LOW MDM: CPT

## 2020-10-31 PROCEDURE — 99284 EMERGENCY DEPT VISIT MOD MDM: CPT | Performed by: EMERGENCY MEDICINE

## 2020-10-31 PROCEDURE — 96372 THER/PROPH/DIAG INJ SC/IM: CPT

## 2020-10-31 RX ORDER — NAPROXEN 500 MG/1
500 TABLET ORAL 2 TIMES DAILY WITH MEALS
Qty: 30 TABLET | Refills: 0 | Status: SHIPPED | OUTPATIENT
Start: 2020-10-31 | End: 2020-12-04

## 2020-10-31 RX ORDER — METHOCARBAMOL 750 MG/1
750 TABLET, FILM COATED ORAL EVERY 6 HOURS PRN
Qty: 20 TABLET | Refills: 0 | Status: SHIPPED | OUTPATIENT
Start: 2020-10-31 | End: 2020-12-04

## 2020-10-31 RX ORDER — METHOCARBAMOL 500 MG/1
500 TABLET, FILM COATED ORAL ONCE
Status: COMPLETED | OUTPATIENT
Start: 2020-10-31 | End: 2020-10-31

## 2020-10-31 RX ORDER — KETOROLAC TROMETHAMINE 30 MG/ML
15 INJECTION, SOLUTION INTRAMUSCULAR; INTRAVENOUS ONCE
Status: COMPLETED | OUTPATIENT
Start: 2020-10-31 | End: 2020-10-31

## 2020-10-31 RX ADMIN — KETOROLAC TROMETHAMINE 15 MG: 30 INJECTION, SOLUTION INTRAMUSCULAR at 02:44

## 2020-10-31 RX ADMIN — METHOCARBAMOL 500 MG: 500 TABLET ORAL at 02:44

## 2020-11-02 ENCOUNTER — APPOINTMENT (OUTPATIENT)
Dept: PHYSICAL THERAPY | Facility: CLINIC | Age: 40
End: 2020-11-02
Payer: OTHER MISCELLANEOUS

## 2020-11-03 ENCOUNTER — OFFICE VISIT (OUTPATIENT)
Dept: OBGYN CLINIC | Facility: CLINIC | Age: 40
End: 2020-11-03
Payer: COMMERCIAL

## 2020-11-03 VITALS — HEIGHT: 65 IN | BODY MASS INDEX: 29.66 KG/M2 | WEIGHT: 178 LBS | TEMPERATURE: 97 F

## 2020-11-03 DIAGNOSIS — S43.402A SPRAIN OF LEFT SHOULDER, UNSPECIFIED SHOULDER SPRAIN TYPE, INITIAL ENCOUNTER: Primary | ICD-10-CM

## 2020-11-03 PROCEDURE — 99213 OFFICE O/P EST LOW 20 MIN: CPT | Performed by: ORTHOPAEDIC SURGERY

## 2020-11-03 PROCEDURE — 3008F BODY MASS INDEX DOCD: CPT | Performed by: ORTHOPAEDIC SURGERY

## 2020-11-03 PROCEDURE — 4004F PT TOBACCO SCREEN RCVD TLK: CPT | Performed by: ORTHOPAEDIC SURGERY

## 2020-11-05 ENCOUNTER — OFFICE VISIT (OUTPATIENT)
Dept: PHYSICAL THERAPY | Facility: CLINIC | Age: 40
End: 2020-11-05
Payer: OTHER MISCELLANEOUS

## 2020-11-05 DIAGNOSIS — M54.16 LUMBAR RADICULOPATHY: ICD-10-CM

## 2020-11-05 DIAGNOSIS — M51.16 LUMBAR DISC HERNIATION WITH RADICULOPATHY: ICD-10-CM

## 2020-11-05 DIAGNOSIS — M51.36 DISC DEGENERATION, LUMBAR: Primary | ICD-10-CM

## 2020-11-05 PROCEDURE — 97112 NEUROMUSCULAR REEDUCATION: CPT | Performed by: PHYSICAL THERAPIST

## 2020-11-05 PROCEDURE — 97110 THERAPEUTIC EXERCISES: CPT | Performed by: PHYSICAL THERAPIST

## 2020-11-05 PROCEDURE — 97140 MANUAL THERAPY 1/> REGIONS: CPT | Performed by: PHYSICAL THERAPIST

## 2020-11-09 ENCOUNTER — OFFICE VISIT (OUTPATIENT)
Dept: PHYSICAL THERAPY | Facility: CLINIC | Age: 40
End: 2020-11-09
Payer: OTHER MISCELLANEOUS

## 2020-11-09 DIAGNOSIS — M54.16 LUMBAR RADICULOPATHY: ICD-10-CM

## 2020-11-09 DIAGNOSIS — M51.16 LUMBAR DISC HERNIATION WITH RADICULOPATHY: ICD-10-CM

## 2020-11-09 DIAGNOSIS — M51.36 DISC DEGENERATION, LUMBAR: Primary | ICD-10-CM

## 2020-11-09 PROCEDURE — 97112 NEUROMUSCULAR REEDUCATION: CPT | Performed by: PHYSICAL THERAPIST

## 2020-11-09 PROCEDURE — 97110 THERAPEUTIC EXERCISES: CPT | Performed by: PHYSICAL THERAPIST

## 2020-11-09 PROCEDURE — 97140 MANUAL THERAPY 1/> REGIONS: CPT | Performed by: PHYSICAL THERAPIST

## 2020-11-12 ENCOUNTER — OFFICE VISIT (OUTPATIENT)
Dept: PHYSICAL THERAPY | Facility: CLINIC | Age: 40
End: 2020-11-12
Payer: OTHER MISCELLANEOUS

## 2020-11-12 DIAGNOSIS — M54.16 LUMBAR RADICULOPATHY: ICD-10-CM

## 2020-11-12 DIAGNOSIS — M51.16 LUMBAR DISC HERNIATION WITH RADICULOPATHY: ICD-10-CM

## 2020-11-12 DIAGNOSIS — M51.36 DISC DEGENERATION, LUMBAR: Primary | ICD-10-CM

## 2020-11-12 PROCEDURE — 97530 THERAPEUTIC ACTIVITIES: CPT

## 2020-11-12 PROCEDURE — 97110 THERAPEUTIC EXERCISES: CPT

## 2020-11-12 PROCEDURE — 97140 MANUAL THERAPY 1/> REGIONS: CPT

## 2020-11-16 ENCOUNTER — OFFICE VISIT (OUTPATIENT)
Dept: PHYSICAL THERAPY | Facility: CLINIC | Age: 40
End: 2020-11-16
Payer: OTHER MISCELLANEOUS

## 2020-11-16 DIAGNOSIS — M51.36 DISC DEGENERATION, LUMBAR: Primary | ICD-10-CM

## 2020-11-16 DIAGNOSIS — M54.16 LUMBAR RADICULOPATHY: ICD-10-CM

## 2020-11-16 DIAGNOSIS — M51.16 LUMBAR DISC HERNIATION WITH RADICULOPATHY: ICD-10-CM

## 2020-11-16 PROCEDURE — 97530 THERAPEUTIC ACTIVITIES: CPT

## 2020-11-16 PROCEDURE — 97110 THERAPEUTIC EXERCISES: CPT

## 2020-11-16 PROCEDURE — 97140 MANUAL THERAPY 1/> REGIONS: CPT

## 2020-11-19 ENCOUNTER — APPOINTMENT (OUTPATIENT)
Dept: PHYSICAL THERAPY | Facility: CLINIC | Age: 40
End: 2020-11-19
Payer: OTHER MISCELLANEOUS

## 2020-11-23 ENCOUNTER — OFFICE VISIT (OUTPATIENT)
Dept: PHYSICAL THERAPY | Facility: CLINIC | Age: 40
End: 2020-11-23
Payer: OTHER MISCELLANEOUS

## 2020-11-23 DIAGNOSIS — M51.36 DISC DEGENERATION, LUMBAR: Primary | ICD-10-CM

## 2020-11-23 DIAGNOSIS — M54.16 LUMBAR RADICULOPATHY: ICD-10-CM

## 2020-11-23 DIAGNOSIS — M51.16 LUMBAR DISC HERNIATION WITH RADICULOPATHY: ICD-10-CM

## 2020-11-23 PROCEDURE — 97530 THERAPEUTIC ACTIVITIES: CPT

## 2020-11-23 PROCEDURE — 97110 THERAPEUTIC EXERCISES: CPT

## 2020-11-23 PROCEDURE — 97140 MANUAL THERAPY 1/> REGIONS: CPT

## 2020-11-25 ENCOUNTER — APPOINTMENT (OUTPATIENT)
Dept: PHYSICAL THERAPY | Facility: CLINIC | Age: 40
End: 2020-11-25
Payer: OTHER MISCELLANEOUS

## 2020-11-30 ENCOUNTER — OFFICE VISIT (OUTPATIENT)
Dept: PHYSICAL THERAPY | Facility: CLINIC | Age: 40
End: 2020-11-30
Payer: OTHER MISCELLANEOUS

## 2020-11-30 DIAGNOSIS — M51.36 DISC DEGENERATION, LUMBAR: Primary | ICD-10-CM

## 2020-11-30 DIAGNOSIS — S43.402A SPRAIN OF LEFT SHOULDER, UNSPECIFIED SHOULDER SPRAIN TYPE, INITIAL ENCOUNTER: ICD-10-CM

## 2020-11-30 DIAGNOSIS — M54.16 LUMBAR RADICULOPATHY: ICD-10-CM

## 2020-11-30 DIAGNOSIS — M51.16 LUMBAR DISC HERNIATION WITH RADICULOPATHY: ICD-10-CM

## 2020-11-30 PROCEDURE — 97140 MANUAL THERAPY 1/> REGIONS: CPT

## 2020-11-30 PROCEDURE — 97530 THERAPEUTIC ACTIVITIES: CPT

## 2020-11-30 PROCEDURE — 97110 THERAPEUTIC EXERCISES: CPT

## 2020-12-03 ENCOUNTER — APPOINTMENT (EMERGENCY)
Dept: RADIOLOGY | Facility: HOSPITAL | Age: 40
End: 2020-12-03
Payer: COMMERCIAL

## 2020-12-03 ENCOUNTER — HOSPITAL ENCOUNTER (EMERGENCY)
Facility: HOSPITAL | Age: 40
Discharge: HOME | End: 2020-12-03
Attending: EMERGENCY MEDICINE
Payer: COMMERCIAL

## 2020-12-03 ENCOUNTER — APPOINTMENT (EMERGENCY)
Dept: CT IMAGING | Facility: HOSPITAL | Age: 40
End: 2020-12-03
Payer: COMMERCIAL

## 2020-12-03 VITALS
HEART RATE: 87 BPM | OXYGEN SATURATION: 96 % | TEMPERATURE: 96.7 F | RESPIRATION RATE: 20 BRPM | SYSTOLIC BLOOD PRESSURE: 108 MMHG | DIASTOLIC BLOOD PRESSURE: 66 MMHG

## 2020-12-03 DIAGNOSIS — R07.9 CHEST PAIN: Primary | ICD-10-CM

## 2020-12-03 LAB
ALBUMIN SERPL BCP-MCNC: 4.2 G/DL (ref 3.5–5)
ALP SERPL-CCNC: 103 U/L (ref 46–116)
ALT SERPL W P-5'-P-CCNC: 30 U/L (ref 12–78)
ANION GAP SERPL CALCULATED.3IONS-SCNC: 13 MMOL/L (ref 4–13)
AST SERPL W P-5'-P-CCNC: 18 U/L (ref 5–45)
ATRIAL RATE: 83 BPM
BASOPHILS # BLD AUTO: 0.09 THOUSANDS/ΜL (ref 0–0.1)
BASOPHILS NFR BLD AUTO: 1 % (ref 0–1)
BILIRUB SERPL-MCNC: 0.8 MG/DL (ref 0.2–1)
BUN SERPL-MCNC: 12 MG/DL (ref 5–25)
CALCIUM SERPL-MCNC: 9.2 MG/DL (ref 8.3–10.1)
CHLORIDE SERPL-SCNC: 99 MMOL/L (ref 100–108)
CO2 SERPL-SCNC: 25 MMOL/L (ref 21–32)
CREAT SERPL-MCNC: 1.22 MG/DL (ref 0.6–1.3)
D DIMER PPP FEU-MCNC: 0.46 UG/ML FEU
EOSINOPHIL # BLD AUTO: 0.12 THOUSAND/ΜL (ref 0–0.61)
EOSINOPHIL NFR BLD AUTO: 1 % (ref 0–6)
ERYTHROCYTE [DISTWIDTH] IN BLOOD BY AUTOMATED COUNT: 13.8 % (ref 11.6–15.1)
GFR SERPL CREATININE-BSD FRML MDRD: 74 ML/MIN/1.73SQ M
GLUCOSE SERPL-MCNC: 163 MG/DL (ref 65–140)
HCT VFR BLD AUTO: 47 % (ref 36.5–49.3)
HGB BLD-MCNC: 15.1 G/DL (ref 12–17)
IMM GRANULOCYTES # BLD AUTO: 0.1 THOUSAND/UL (ref 0–0.2)
IMM GRANULOCYTES NFR BLD AUTO: 1 % (ref 0–2)
LYMPHOCYTES # BLD AUTO: 3.33 THOUSANDS/ΜL (ref 0.6–4.47)
LYMPHOCYTES NFR BLD AUTO: 22 % (ref 14–44)
MCH RBC QN AUTO: 26.9 PG (ref 26.8–34.3)
MCHC RBC AUTO-ENTMCNC: 32.1 G/DL (ref 31.4–37.4)
MCV RBC AUTO: 84 FL (ref 82–98)
MONOCYTES # BLD AUTO: 1.19 THOUSAND/ΜL (ref 0.17–1.22)
MONOCYTES NFR BLD AUTO: 8 % (ref 4–12)
NEUTROPHILS # BLD AUTO: 10.06 THOUSANDS/ΜL (ref 1.85–7.62)
NEUTS SEG NFR BLD AUTO: 67 % (ref 43–75)
NRBC BLD AUTO-RTO: 0 /100 WBCS
P AXIS: 50 DEGREES
PLATELET # BLD AUTO: 269 THOUSANDS/UL (ref 149–390)
PMV BLD AUTO: 10.5 FL (ref 8.9–12.7)
POTASSIUM SERPL-SCNC: 3.5 MMOL/L (ref 3.5–5.3)
PR INTERVAL: 154 MS
PROT SERPL-MCNC: 7.8 G/DL (ref 6.4–8.2)
QRS AXIS: 48 DEGREES
QRSD INTERVAL: 82 MS
QT INTERVAL: 376 MS
QTC INTERVAL: 441 MS
RBC # BLD AUTO: 5.62 MILLION/UL (ref 3.88–5.62)
SODIUM SERPL-SCNC: 137 MMOL/L (ref 136–145)
T WAVE AXIS: 32 DEGREES
TROPONIN I SERPL-MCNC: <0.02 NG/ML
TROPONIN I SERPL-MCNC: <0.02 NG/ML
VENTRICULAR RATE: 83 BPM
WBC # BLD AUTO: 14.89 THOUSAND/UL (ref 4.31–10.16)

## 2020-12-03 PROCEDURE — 96361 HYDRATE IV INFUSION ADD-ON: CPT

## 2020-12-03 PROCEDURE — 93005 ELECTROCARDIOGRAM TRACING: CPT

## 2020-12-03 PROCEDURE — 99285 EMERGENCY DEPT VISIT HI MDM: CPT | Performed by: EMERGENCY MEDICINE

## 2020-12-03 PROCEDURE — 71275 CT ANGIOGRAPHY CHEST: CPT

## 2020-12-03 PROCEDURE — 84484 ASSAY OF TROPONIN QUANT: CPT | Performed by: EMERGENCY MEDICINE

## 2020-12-03 PROCEDURE — 36415 COLL VENOUS BLD VENIPUNCTURE: CPT | Performed by: EMERGENCY MEDICINE

## 2020-12-03 PROCEDURE — 93010 ELECTROCARDIOGRAM REPORT: CPT | Performed by: INTERNAL MEDICINE

## 2020-12-03 PROCEDURE — 74174 CTA ABD&PLVS W/CONTRAST: CPT

## 2020-12-03 PROCEDURE — 71045 X-RAY EXAM CHEST 1 VIEW: CPT

## 2020-12-03 PROCEDURE — G1004 CDSM NDSC: HCPCS

## 2020-12-03 PROCEDURE — 85025 COMPLETE CBC W/AUTO DIFF WBC: CPT | Performed by: EMERGENCY MEDICINE

## 2020-12-03 PROCEDURE — 85379 FIBRIN DEGRADATION QUANT: CPT | Performed by: EMERGENCY MEDICINE

## 2020-12-03 PROCEDURE — 80053 COMPREHEN METABOLIC PANEL: CPT | Performed by: EMERGENCY MEDICINE

## 2020-12-03 PROCEDURE — 96374 THER/PROPH/DIAG INJ IV PUSH: CPT

## 2020-12-03 PROCEDURE — 96375 TX/PRO/DX INJ NEW DRUG ADDON: CPT

## 2020-12-03 PROCEDURE — 99285 EMERGENCY DEPT VISIT HI MDM: CPT

## 2020-12-03 RX ORDER — NITROGLYCERIN 0.4 MG/1
TABLET SUBLINGUAL
Status: DISCONTINUED
Start: 2020-12-03 | End: 2020-12-03 | Stop reason: HOSPADM

## 2020-12-03 RX ORDER — KETOROLAC TROMETHAMINE 30 MG/ML
15 INJECTION, SOLUTION INTRAMUSCULAR; INTRAVENOUS ONCE
Status: COMPLETED | OUTPATIENT
Start: 2020-12-03 | End: 2020-12-03

## 2020-12-03 RX ORDER — MORPHINE SULFATE 4 MG/ML
4 INJECTION, SOLUTION INTRAMUSCULAR; INTRAVENOUS ONCE
Status: COMPLETED | OUTPATIENT
Start: 2020-12-03 | End: 2020-12-03

## 2020-12-03 RX ORDER — NITROGLYCERIN 0.4 MG/1
0.4 TABLET SUBLINGUAL ONCE
Status: COMPLETED | OUTPATIENT
Start: 2020-12-03 | End: 2020-12-03

## 2020-12-03 RX ADMIN — NITROGLYCERIN 0.4 MG: 0.4 TABLET SUBLINGUAL at 01:04

## 2020-12-03 RX ADMIN — MORPHINE SULFATE 2 MG: 2 INJECTION, SOLUTION INTRAMUSCULAR; INTRAVENOUS at 01:17

## 2020-12-03 RX ADMIN — IOHEXOL 100 ML: 350 INJECTION, SOLUTION INTRAVENOUS at 04:13

## 2020-12-03 RX ADMIN — SODIUM CHLORIDE 1000 ML: 0.9 INJECTION, SOLUTION INTRAVENOUS at 01:04

## 2020-12-03 RX ADMIN — KETOROLAC TROMETHAMINE 15 MG: 30 INJECTION, SOLUTION INTRAMUSCULAR at 04:48

## 2020-12-03 RX ADMIN — MORPHINE SULFATE 4 MG: 4 INJECTION, SOLUTION INTRAMUSCULAR; INTRAVENOUS at 00:50

## 2020-12-04 ENCOUNTER — HOSPITAL ENCOUNTER (EMERGENCY)
Facility: HOSPITAL | Age: 40
Discharge: HOME | End: 2020-12-04
Attending: EMERGENCY MEDICINE
Payer: COMMERCIAL

## 2020-12-04 ENCOUNTER — APPOINTMENT (EMERGENCY)
Dept: RADIOLOGY | Facility: HOSPITAL | Age: 40
End: 2020-12-04
Payer: COMMERCIAL

## 2020-12-04 ENCOUNTER — TELEMEDICINE (OUTPATIENT)
Dept: FAMILY MEDICINE CLINIC | Facility: CLINIC | Age: 40
End: 2020-12-04
Payer: COMMERCIAL

## 2020-12-04 VITALS — HEIGHT: 65 IN | BODY MASS INDEX: 29.66 KG/M2 | TEMPERATURE: 95.7 F | WEIGHT: 178 LBS

## 2020-12-04 VITALS
OXYGEN SATURATION: 98 % | TEMPERATURE: 98.1 F | HEART RATE: 81 BPM | SYSTOLIC BLOOD PRESSURE: 128 MMHG | DIASTOLIC BLOOD PRESSURE: 68 MMHG | RESPIRATION RATE: 20 BRPM | BODY MASS INDEX: 29.94 KG/M2 | WEIGHT: 179.9 LBS

## 2020-12-04 DIAGNOSIS — R07.9 CHEST PAIN, UNSPECIFIED TYPE: Primary | ICD-10-CM

## 2020-12-04 DIAGNOSIS — R07.9 CHEST PAIN: ICD-10-CM

## 2020-12-04 DIAGNOSIS — R06.00 DYSPNEA: Primary | ICD-10-CM

## 2020-12-04 DIAGNOSIS — R06.02 SHORTNESS OF BREATH: ICD-10-CM

## 2020-12-04 LAB
ALBUMIN SERPL BCP-MCNC: 3.4 G/DL (ref 3.5–5)
ALP SERPL-CCNC: 84 U/L (ref 46–116)
ALT SERPL W P-5'-P-CCNC: 27 U/L (ref 12–78)
ANION GAP SERPL CALCULATED.3IONS-SCNC: 11 MMOL/L (ref 4–13)
APTT PPP: 26 SECONDS (ref 23–37)
AST SERPL W P-5'-P-CCNC: 16 U/L (ref 5–45)
ATRIAL RATE: 117 BPM
ATRIAL RATE: 85 BPM
BASOPHILS # BLD AUTO: 0.07 THOUSANDS/ΜL (ref 0–0.1)
BASOPHILS NFR BLD AUTO: 1 % (ref 0–1)
BILIRUB SERPL-MCNC: 0.4 MG/DL (ref 0.2–1)
BUN SERPL-MCNC: 18 MG/DL (ref 5–25)
CALCIUM ALBUM COR SERPL-MCNC: 9.6 MG/DL (ref 8.3–10.1)
CALCIUM SERPL-MCNC: 9.1 MG/DL (ref 8.3–10.1)
CHLORIDE SERPL-SCNC: 106 MMOL/L (ref 100–108)
CO2 SERPL-SCNC: 23 MMOL/L (ref 21–32)
CREAT SERPL-MCNC: 1.17 MG/DL (ref 0.6–1.3)
D DIMER PPP FEU-MCNC: 0.55 UG/ML FEU
EOSINOPHIL # BLD AUTO: 0.17 THOUSAND/ΜL (ref 0–0.61)
EOSINOPHIL NFR BLD AUTO: 2 % (ref 0–6)
ERYTHROCYTE [DISTWIDTH] IN BLOOD BY AUTOMATED COUNT: 14 % (ref 11.6–15.1)
FLUAV RNA RESP QL NAA+PROBE: NEGATIVE
FLUBV RNA RESP QL NAA+PROBE: NEGATIVE
GFR SERPL CREATININE-BSD FRML MDRD: 78 ML/MIN/1.73SQ M
GLUCOSE SERPL-MCNC: 117 MG/DL (ref 65–140)
HCT VFR BLD AUTO: 42.1 % (ref 36.5–49.3)
HGB BLD-MCNC: 13.4 G/DL (ref 12–17)
IMM GRANULOCYTES # BLD AUTO: 0.05 THOUSAND/UL (ref 0–0.2)
IMM GRANULOCYTES NFR BLD AUTO: 1 % (ref 0–2)
INR PPP: 1.01 (ref 0.84–1.19)
LYMPHOCYTES # BLD AUTO: 2.62 THOUSANDS/ΜL (ref 0.6–4.47)
LYMPHOCYTES NFR BLD AUTO: 24 % (ref 14–44)
MAGNESIUM SERPL-MCNC: 2.1 MG/DL (ref 1.6–2.6)
MCH RBC QN AUTO: 26.5 PG (ref 26.8–34.3)
MCHC RBC AUTO-ENTMCNC: 31.8 G/DL (ref 31.4–37.4)
MCV RBC AUTO: 83 FL (ref 82–98)
MONOCYTES # BLD AUTO: 0.58 THOUSAND/ΜL (ref 0.17–1.22)
MONOCYTES NFR BLD AUTO: 5 % (ref 4–12)
NEUTROPHILS # BLD AUTO: 7.23 THOUSANDS/ΜL (ref 1.85–7.62)
NEUTS SEG NFR BLD AUTO: 67 % (ref 43–75)
NRBC BLD AUTO-RTO: 0 /100 WBCS
NT-PROBNP SERPL-MCNC: 39 PG/ML
P AXIS: 56 DEGREES
P AXIS: 57 DEGREES
PLATELET # BLD AUTO: 215 THOUSANDS/UL (ref 149–390)
PMV BLD AUTO: 10.4 FL (ref 8.9–12.7)
POTASSIUM SERPL-SCNC: 3.5 MMOL/L (ref 3.5–5.3)
PR INTERVAL: 140 MS
PR INTERVAL: 144 MS
PROT SERPL-MCNC: 6.8 G/DL (ref 6.4–8.2)
PROTHROMBIN TIME: 13.1 SECONDS (ref 11.6–14.5)
QRS AXIS: 49 DEGREES
QRS AXIS: 86 DEGREES
QRSD INTERVAL: 80 MS
QRSD INTERVAL: 82 MS
QT INTERVAL: 338 MS
QT INTERVAL: 366 MS
QTC INTERVAL: 435 MS
QTC INTERVAL: 471 MS
RBC # BLD AUTO: 5.05 MILLION/UL (ref 3.88–5.62)
RSV RNA RESP QL NAA+PROBE: NEGATIVE
SARS-COV-2 RNA RESP QL NAA+PROBE: NEGATIVE
SODIUM SERPL-SCNC: 140 MMOL/L (ref 136–145)
T WAVE AXIS: 49 DEGREES
T WAVE AXIS: 51 DEGREES
TROPONIN I SERPL-MCNC: <0.02 NG/ML
VENTRICULAR RATE: 117 BPM
VENTRICULAR RATE: 85 BPM
WBC # BLD AUTO: 10.72 THOUSAND/UL (ref 4.31–10.16)

## 2020-12-04 PROCEDURE — 71045 X-RAY EXAM CHEST 1 VIEW: CPT

## 2020-12-04 PROCEDURE — 83880 ASSAY OF NATRIURETIC PEPTIDE: CPT | Performed by: PHYSICIAN ASSISTANT

## 2020-12-04 PROCEDURE — 36415 COLL VENOUS BLD VENIPUNCTURE: CPT | Performed by: PHYSICIAN ASSISTANT

## 2020-12-04 PROCEDURE — 83735 ASSAY OF MAGNESIUM: CPT | Performed by: PHYSICIAN ASSISTANT

## 2020-12-04 PROCEDURE — 93005 ELECTROCARDIOGRAM TRACING: CPT

## 2020-12-04 PROCEDURE — 84484 ASSAY OF TROPONIN QUANT: CPT | Performed by: PHYSICIAN ASSISTANT

## 2020-12-04 PROCEDURE — 99285 EMERGENCY DEPT VISIT HI MDM: CPT | Performed by: PHYSICIAN ASSISTANT

## 2020-12-04 PROCEDURE — 3725F SCREEN DEPRESSION PERFORMED: CPT | Performed by: PHYSICIAN ASSISTANT

## 2020-12-04 PROCEDURE — 99213 OFFICE O/P EST LOW 20 MIN: CPT | Performed by: PHYSICIAN ASSISTANT

## 2020-12-04 PROCEDURE — 85025 COMPLETE CBC W/AUTO DIFF WBC: CPT | Performed by: PHYSICIAN ASSISTANT

## 2020-12-04 PROCEDURE — 80053 COMPREHEN METABOLIC PANEL: CPT | Performed by: PHYSICIAN ASSISTANT

## 2020-12-04 PROCEDURE — 85379 FIBRIN DEGRADATION QUANT: CPT | Performed by: PHYSICIAN ASSISTANT

## 2020-12-04 PROCEDURE — 93010 ELECTROCARDIOGRAM REPORT: CPT | Performed by: INTERNAL MEDICINE

## 2020-12-04 PROCEDURE — 0241U HB NFCT DS VIR RESP RNA 4 TRGT: CPT | Performed by: PHYSICIAN ASSISTANT

## 2020-12-04 PROCEDURE — 85730 THROMBOPLASTIN TIME PARTIAL: CPT | Performed by: PHYSICIAN ASSISTANT

## 2020-12-04 PROCEDURE — 85610 PROTHROMBIN TIME: CPT | Performed by: PHYSICIAN ASSISTANT

## 2020-12-04 PROCEDURE — 99285 EMERGENCY DEPT VISIT HI MDM: CPT

## 2020-12-04 RX ORDER — PREDNISONE 20 MG/1
40 TABLET ORAL ONCE
Status: COMPLETED | OUTPATIENT
Start: 2020-12-04 | End: 2020-12-04

## 2020-12-04 RX ORDER — AZITHROMYCIN 250 MG/1
500 TABLET, FILM COATED ORAL ONCE
Status: COMPLETED | OUTPATIENT
Start: 2020-12-04 | End: 2020-12-04

## 2020-12-04 RX ORDER — IPRATROPIUM BROMIDE AND ALBUTEROL SULFATE 2.5; .5 MG/3ML; MG/3ML
3 SOLUTION RESPIRATORY (INHALATION) ONCE
Status: COMPLETED | OUTPATIENT
Start: 2020-12-04 | End: 2020-12-04

## 2020-12-04 RX ORDER — ALBUTEROL SULFATE 90 UG/1
1-2 AEROSOL, METERED RESPIRATORY (INHALATION) EVERY 4 HOURS PRN
Qty: 1 INHALER | Refills: 0 | Status: SHIPPED | OUTPATIENT
Start: 2020-12-04 | End: 2022-06-08

## 2020-12-04 RX ORDER — AZITHROMYCIN 250 MG/1
250 TABLET, FILM COATED ORAL EVERY 24 HOURS
Qty: 4 TABLET | Refills: 0 | Status: SHIPPED | OUTPATIENT
Start: 2020-12-05 | End: 2020-12-09

## 2020-12-04 RX ORDER — PREDNISONE 20 MG/1
40 TABLET ORAL DAILY
Qty: 8 TABLET | Refills: 0 | Status: SHIPPED | OUTPATIENT
Start: 2020-12-05 | End: 2020-12-09

## 2020-12-04 RX ADMIN — PREDNISONE 40 MG: 20 TABLET ORAL at 11:03

## 2020-12-04 RX ADMIN — IPRATROPIUM BROMIDE AND ALBUTEROL SULFATE 3 ML: 2.5; .5 SOLUTION RESPIRATORY (INHALATION) at 11:03

## 2020-12-04 RX ADMIN — AZITHROMYCIN 500 MG: 250 TABLET, FILM COATED ORAL at 11:03

## 2020-12-07 ENCOUNTER — TELEPHONE (OUTPATIENT)
Dept: FAMILY MEDICINE CLINIC | Facility: CLINIC | Age: 40
End: 2020-12-07

## 2020-12-11 ENCOUNTER — OFFICE VISIT (OUTPATIENT)
Dept: FAMILY MEDICINE CLINIC | Facility: CLINIC | Age: 40
End: 2020-12-11
Payer: COMMERCIAL

## 2020-12-11 VITALS
DIASTOLIC BLOOD PRESSURE: 68 MMHG | SYSTOLIC BLOOD PRESSURE: 104 MMHG | HEIGHT: 65 IN | WEIGHT: 176.6 LBS | TEMPERATURE: 98.6 F | BODY MASS INDEX: 29.42 KG/M2

## 2020-12-11 DIAGNOSIS — R06.02 SHORTNESS OF BREATH ON EXERTION: Primary | ICD-10-CM

## 2020-12-11 PROCEDURE — 3008F BODY MASS INDEX DOCD: CPT | Performed by: PHYSICIAN ASSISTANT

## 2020-12-11 PROCEDURE — 99214 OFFICE O/P EST MOD 30 MIN: CPT | Performed by: PHYSICIAN ASSISTANT

## 2020-12-11 PROCEDURE — 4004F PT TOBACCO SCREEN RCVD TLK: CPT | Performed by: PHYSICIAN ASSISTANT

## 2021-01-08 ENCOUNTER — HOSPITAL ENCOUNTER (OUTPATIENT)
Dept: NUCLEAR MEDICINE | Facility: HOSPITAL | Age: 41
Discharge: HOME | End: 2021-01-08
Payer: COMMERCIAL

## 2021-01-08 ENCOUNTER — HOSPITAL ENCOUNTER (OUTPATIENT)
Dept: NON INVASIVE DIAGNOSTICS | Facility: HOSPITAL | Age: 41
Discharge: HOME | End: 2021-01-08
Payer: COMMERCIAL

## 2021-01-08 DIAGNOSIS — R06.02 SHORTNESS OF BREATH ON EXERTION: ICD-10-CM

## 2021-01-08 PROCEDURE — G1004 CDSM NDSC: HCPCS

## 2021-01-08 PROCEDURE — 93306 TTE W/DOPPLER COMPLETE: CPT | Performed by: INTERNAL MEDICINE

## 2021-01-08 PROCEDURE — 93018 CV STRESS TEST I&R ONLY: CPT | Performed by: INTERNAL MEDICINE

## 2021-01-08 PROCEDURE — 93016 CV STRESS TEST SUPVJ ONLY: CPT | Performed by: INTERNAL MEDICINE

## 2021-01-08 PROCEDURE — 93306 TTE W/DOPPLER COMPLETE: CPT

## 2021-01-08 PROCEDURE — 78452 HT MUSCLE IMAGE SPECT MULT: CPT

## 2021-01-08 PROCEDURE — 78452 HT MUSCLE IMAGE SPECT MULT: CPT | Performed by: INTERNAL MEDICINE

## 2021-01-08 PROCEDURE — A9502 TC99M TETROFOSMIN: HCPCS

## 2021-01-08 PROCEDURE — 93017 CV STRESS TEST TRACING ONLY: CPT

## 2021-01-08 RX ADMIN — REGADENOSON 0.4 MG: 0.08 INJECTION, SOLUTION INTRAVENOUS at 10:25

## 2021-01-11 LAB
CHEST PAIN STATEMENT: NORMAL
MAX DIASTOLIC BP: 70 MMHG
MAX HEART RATE: 142 BPM
MAX PREDICTED HEART RATE: 181 BPM
MAX. SYSTOLIC BP: 126 MMHG
PROTOCOL NAME: NORMAL
REASON FOR TERMINATION: NORMAL
TARGET HR FORMULA: NORMAL
TEST INDICATION: NORMAL
TIME IN EXERCISE PHASE: NORMAL

## 2021-12-22 ENCOUNTER — OFFICE VISIT (OUTPATIENT)
Dept: FAMILY MEDICINE CLINIC | Facility: CLINIC | Age: 41
End: 2021-12-22
Payer: COMMERCIAL

## 2021-12-22 VITALS
DIASTOLIC BLOOD PRESSURE: 88 MMHG | HEART RATE: 101 BPM | WEIGHT: 182.6 LBS | TEMPERATURE: 99 F | HEIGHT: 65 IN | BODY MASS INDEX: 30.42 KG/M2 | OXYGEN SATURATION: 97 % | SYSTOLIC BLOOD PRESSURE: 148 MMHG

## 2021-12-22 DIAGNOSIS — G43.109 MIGRAINE WITH AURA AND WITHOUT STATUS MIGRAINOSUS, NOT INTRACTABLE: ICD-10-CM

## 2021-12-22 DIAGNOSIS — K44.9 HIATAL HERNIA: ICD-10-CM

## 2021-12-22 DIAGNOSIS — K21.00 GASTROESOPHAGEAL REFLUX DISEASE WITH ESOPHAGITIS WITHOUT HEMORRHAGE: ICD-10-CM

## 2021-12-22 DIAGNOSIS — Z23 NEED FOR INFLUENZA VACCINATION: ICD-10-CM

## 2021-12-22 DIAGNOSIS — Z00.00 ANNUAL PHYSICAL EXAM: Primary | ICD-10-CM

## 2021-12-22 PROCEDURE — 4004F PT TOBACCO SCREEN RCVD TLK: CPT | Performed by: FAMILY MEDICINE

## 2021-12-22 PROCEDURE — 90471 IMMUNIZATION ADMIN: CPT | Performed by: FAMILY MEDICINE

## 2021-12-22 PROCEDURE — 90686 IIV4 VACC NO PRSV 0.5 ML IM: CPT | Performed by: FAMILY MEDICINE

## 2021-12-22 PROCEDURE — 3725F SCREEN DEPRESSION PERFORMED: CPT | Performed by: FAMILY MEDICINE

## 2021-12-22 PROCEDURE — 99396 PREV VISIT EST AGE 40-64: CPT | Performed by: FAMILY MEDICINE

## 2021-12-22 PROCEDURE — 3008F BODY MASS INDEX DOCD: CPT | Performed by: FAMILY MEDICINE

## 2021-12-22 RX ORDER — OMEPRAZOLE 20 MG/1
20 CAPSULE, DELAYED RELEASE ORAL
Qty: 30 CAPSULE | Refills: 3 | Status: SHIPPED | OUTPATIENT
Start: 2021-12-22 | End: 2022-04-20 | Stop reason: SDUPTHER

## 2021-12-22 RX ORDER — PROPRANOLOL HYDROCHLORIDE 20 MG/1
20 TABLET ORAL EVERY 12 HOURS SCHEDULED
Qty: 60 TABLET | Refills: 1 | Status: SHIPPED | OUTPATIENT
Start: 2021-12-22 | End: 2022-01-26

## 2022-01-10 ENCOUNTER — OFFICE VISIT (OUTPATIENT)
Dept: GASTROENTEROLOGY | Facility: CLINIC | Age: 42
End: 2022-01-10
Payer: COMMERCIAL

## 2022-01-10 VITALS
HEART RATE: 74 BPM | SYSTOLIC BLOOD PRESSURE: 130 MMHG | TEMPERATURE: 98.6 F | DIASTOLIC BLOOD PRESSURE: 86 MMHG | WEIGHT: 186.6 LBS | BODY MASS INDEX: 31.09 KG/M2 | HEIGHT: 65 IN

## 2022-01-10 DIAGNOSIS — K44.9 HIATAL HERNIA: ICD-10-CM

## 2022-01-10 DIAGNOSIS — K58.0 IRRITABLE BOWEL SYNDROME WITH DIARRHEA: Primary | ICD-10-CM

## 2022-01-10 DIAGNOSIS — K21.00 GASTROESOPHAGEAL REFLUX DISEASE WITH ESOPHAGITIS WITHOUT HEMORRHAGE: ICD-10-CM

## 2022-01-10 PROCEDURE — 99213 OFFICE O/P EST LOW 20 MIN: CPT | Performed by: INTERNAL MEDICINE

## 2022-01-10 RX ORDER — DIPHENOXYLATE HYDROCHLORIDE AND ATROPINE SULFATE 2.5; .025 MG/1; MG/1
1 TABLET ORAL 4 TIMES DAILY PRN
Qty: 60 TABLET | Refills: 3 | Status: SHIPPED | OUTPATIENT
Start: 2022-01-10 | End: 2022-02-09

## 2022-01-10 NOTE — PROGRESS NOTES
[facility] - Outpatient Follow-up Note  Jasmin Dawson 37 y o  male MRN: [id number]  Encounter: [id number]          ASSESSMENT AND PLAN:      1  Hiatal hernia  -     Ambulatory referral to Gastroenterology    2  Gastroesophageal reflux disease with esophagitis without hemorrhage  -     Ambulatory referral to Gastroenterology     This is a 72-year-old white male with complaints of left lower quadrant pain and diarrhea  Most likely suffers from irritable bowel syndrome with diarrhea  I would treat him with an antispasmodic as well as Lomotil since he has not responded to Imodium in the past   No further GI workup is needed at this time  Thank you so much for referring this patient   ______________________________________________________________________    SUBJECTIVE:  Marianela Allen returns because he is still experiencing left lower quadrant pain  He does have diarrhea when he moves his bowels but this is unchanged  He had a colonoscopy two and half years ago which revealed hyperplastic polyps of no significance  Random biopsies of the colon were negative for microscopic colitis  He has tried Imodium without any significant improvement in his diarrhea  REVIEW OF SYSTEMS IS OTHERWISE NEGATIVE        Historical Information   Past Medical History:   Diagnosis Date    Diarrhea      Past Surgical History:   Procedure Laterality Date    BACK SURGERY      herniated disk removal    FINGER AMPUTATION Right     reattachment of right thumb    FINGER SURGERY Right     thumb reattached      Social History   Social History     Substance and Sexual Activity   Alcohol Use Not Currently    Comment: rarely     Social History     Substance and Sexual Activity   Drug Use No     Social History     Tobacco Use   Smoking Status Current Every Day Smoker    Packs/day: 0 50   Smokeless Tobacco Never Used     Family History   Problem Relation Age of Onset    Breast cancer Mother     Hypertension Mother  Diabetes Cousin        Meds/Allergies       Current Outpatient Medications:     albuterol (PROVENTIL HFA,VENTOLIN HFA) 90 mcg/act inhaler    omeprazole (PriLOSEC) 20 mg delayed release capsule    propranolol (INDERAL) 20 mg tablet    Allergies   Allergen Reactions    Strawberry Extract - Food Allergy Rash           Objective     Blood pressure 130/86, pulse 74, temperature 98 6 °F (37 °C), temperature source Tympanic, height 5' 5" (1 651 m), weight 84 6 kg (186 lb 9 6 oz)  Body mass index is 31 05 kg/m²  PHYSICAL EXAM:      General Appearance:   Alert, cooperative, no distress   HEENT:   Normocephalic, atraumatic, anicteric  Neck:  Supple, symmetrical, trachea midline   Lungs:   Clear to auscultation bilaterally; no rales, rhonchi or wheezing; respirations unlabored    Heart[de-identified]   Regular rate and rhythm; no murmur, rub, or gallop  Abdomen:   Soft, non-tender, non-distended; normal bowel sounds; no masses, no organomegaly    Genitalia:   Deferred    Rectal:   Deferred    Extremities:  No cyanosis, clubbing or edema    Pulses:  2+ and symmetric    Skin:  No jaundice, rashes, or lesions    Lymph nodes:  No palpable cervical lymphadenopathy        Lab Results:   No visits with results within 1 Day(s) from this visit  Latest known visit with results is:   Hospital Outpatient Visit on 01/08/2021   Component Date Value    Protocol Name 01/08/2021 DIRK SIT     Time In Exercise Phase 01/08/2021 00:03:04     MAX  SYSTOLIC BP 98/59/5162 891     Max Diastolic Bp 82/18/2585 70     Max Heart Rate 01/08/2021 142     Max Predicted Heart Rate 01/08/2021 181     Reason for Termination 01/08/2021 Test Complete     Test Indication 01/08/2021 Screening for CAD     Target Hr Formular 01/08/2021 (220 - Age)*85%     Chest Pain Statement 01/08/2021 none          Radiology Results:   No results found

## 2022-01-26 ENCOUNTER — OFFICE VISIT (OUTPATIENT)
Dept: FAMILY MEDICINE CLINIC | Facility: CLINIC | Age: 42
End: 2022-01-26
Payer: COMMERCIAL

## 2022-01-26 VITALS
WEIGHT: 183.2 LBS | TEMPERATURE: 98.3 F | HEART RATE: 84 BPM | HEIGHT: 65 IN | SYSTOLIC BLOOD PRESSURE: 112 MMHG | DIASTOLIC BLOOD PRESSURE: 72 MMHG | OXYGEN SATURATION: 93 % | BODY MASS INDEX: 30.52 KG/M2

## 2022-01-26 DIAGNOSIS — G43.109 MIGRAINE WITH AURA AND WITHOUT STATUS MIGRAINOSUS, NOT INTRACTABLE: Primary | ICD-10-CM

## 2022-01-26 DIAGNOSIS — K58.0 IRRITABLE BOWEL SYNDROME WITH DIARRHEA: ICD-10-CM

## 2022-01-26 DIAGNOSIS — Z13.6 SCREENING FOR CARDIOVASCULAR CONDITION: ICD-10-CM

## 2022-01-26 PROCEDURE — 4004F PT TOBACCO SCREEN RCVD TLK: CPT | Performed by: FAMILY MEDICINE

## 2022-01-26 PROCEDURE — 3008F BODY MASS INDEX DOCD: CPT | Performed by: FAMILY MEDICINE

## 2022-01-26 PROCEDURE — 99214 OFFICE O/P EST MOD 30 MIN: CPT | Performed by: FAMILY MEDICINE

## 2022-01-26 RX ORDER — PROPRANOLOL HYDROCHLORIDE 20 MG/1
20 TABLET ORAL EVERY 12 HOURS SCHEDULED
Qty: 60 TABLET | Refills: 5 | Status: SHIPPED | OUTPATIENT
Start: 2022-01-26 | End: 2022-02-21 | Stop reason: SDUPTHER

## 2022-01-26 NOTE — PROGRESS NOTES
Assessment/Plan:  I refilled patient's medications  Also Rx given for fasting blood work  I will see him back in 4 months or p r n     I encouraged him to continue to cut back and hopefully quit smoking  Problem List Items Addressed This Visit        Cardiovascular and Mediastinum    Migraine with aura and without status migrainosus, not intractable - Primary    Relevant Medications    propranolol (INDERAL) 20 mg tablet    Other Relevant Orders    CBC and differential    TSH, 3rd generation with Free T4 reflex      Other Visit Diagnoses     Irritable bowel syndrome with diarrhea        Relevant Medications    hyoscyamine (LEVSIN/SL) 0 125 mg SL tablet    Other Relevant Orders    Comprehensive metabolic panel    CBC and differential    TSH, 3rd generation with Free T4 reflex    Screening for cardiovascular condition        Relevant Orders    Comprehensive metabolic panel    Lipid Panel with Direct LDL reflex           Diagnoses and all orders for this visit:    Migraine with aura and without status migrainosus, not intractable  -     propranolol (INDERAL) 20 mg tablet; Take 1 tablet (20 mg total) by mouth every 12 (twelve) hours  -     CBC and differential  -     TSH, 3rd generation with Free T4 reflex    Irritable bowel syndrome with diarrhea  -     hyoscyamine (LEVSIN/SL) 0 125 mg SL tablet; Take 1 tablet (0 125 mg total) by mouth every 6 (six) hours as needed for cramping  -     Comprehensive metabolic panel  -     CBC and differential  -     TSH, 3rd generation with Free T4 reflex    Screening for cardiovascular condition  -     Comprehensive metabolic panel  -     Lipid Panel with Direct LDL reflex        No problem-specific Assessment & Plan notes found for this encounter  Subjective:      Patient ID: Roxy Will is a 37 y o  male  Patient here today for follow-up  Patient states the propranolol helped with his headaches  Only had 1 or 2 last month    Patient states his GERD is well controlled on the omeprazole  Patient did see GI for irritable bowel  Was given Lomotil  The Levsin was not filled  This might be due to formulary  Patient has tried to cut back on his smoking still    Heartburn  He complains of heartburn  This is a chronic problem  The problem has been rapidly improving  The symptoms are aggravated by certain foods  Risk factors include smoking/tobacco exposure  He has tried a PPI for the symptoms  The treatment provided significant relief  Migraine   This is a chronic problem  The problem has been rapidly improving  He has tried beta blockers for the symptoms  The treatment provided significant relief  The following portions of the patient's history were reviewed and updated as appropriate:   He has a past medical history of Diarrhea ,  does not have any pertinent problems on file  ,   has a past surgical history that includes Back surgery; Finger surgery (Right); and Finger amputation (Right)  ,  family history includes Breast cancer in his mother; Diabetes in his cousin; Hypertension in his mother  ,   reports that he has been smoking  He has been smoking about 0 50 packs per day  He has never used smokeless tobacco  He reports previous alcohol use  He reports that he does not use drugs  ,  is allergic to strawberry extract - food allergy     Current Outpatient Medications   Medication Sig Dispense Refill    diphenoxylate-atropine (LOMOTIL) 2 5-0 025 mg per tablet Take 1 tablet by mouth 4 (four) times a day as needed for diarrhea 60 tablet 3    omeprazole (PriLOSEC) 20 mg delayed release capsule Take 1 capsule (20 mg total) by mouth daily before breakfast 30 capsule 3    propranolol (INDERAL) 20 mg tablet Take 1 tablet (20 mg total) by mouth every 12 (twelve) hours 60 tablet 5    albuterol (PROVENTIL HFA,VENTOLIN HFA) 90 mcg/act inhaler Inhale 1-2 puffs every 4 (four) hours as needed for wheezing or shortness of breath (Patient not taking: Reported on 12/22/2021 ) 1 Inhaler 0    hyoscyamine (LEVSIN/SL) 0 125 mg SL tablet Take 1 tablet (0 125 mg total) by mouth every 6 (six) hours as needed for cramping 60 tablet 3     No current facility-administered medications for this visit  Review of Systems   Constitutional: Negative  Respiratory: Negative  Cardiovascular: Negative  Gastrointestinal: Positive for heartburn  Genitourinary: Negative  Objective:  Vitals:    01/26/22 0934   BP: 112/72   Pulse: 84   Temp: 98 3 °F (36 8 °C)   SpO2: 93%   Weight: 83 1 kg (183 lb 3 2 oz)   Height: 5' 5" (1 651 m)     Body mass index is 30 49 kg/m²  Physical Exam  Vitals reviewed  Constitutional:       General: He is not in acute distress  Appearance: Normal appearance  He is well-developed  He is not ill-appearing, toxic-appearing or diaphoretic  HENT:      Head: Normocephalic and atraumatic  Eyes:      Conjunctiva/sclera: Conjunctivae normal    Cardiovascular:      Rate and Rhythm: Normal rate and regular rhythm  Heart sounds: Normal heart sounds  No murmur heard  No friction rub  No gallop  Pulmonary:      Effort: Pulmonary effort is normal  No respiratory distress  Breath sounds: Normal breath sounds  No wheezing, rhonchi or rales  Musculoskeletal:      Right lower leg: No edema  Left lower leg: No edema  Neurological:      General: No focal deficit present  Mental Status: He is alert and oriented to person, place, and time  Psychiatric:         Mood and Affect: Mood normal          Behavior: Behavior normal          Thought Content:  Thought content normal          Judgment: Judgment normal

## 2022-02-03 ENCOUNTER — APPOINTMENT (OUTPATIENT)
Dept: LAB | Facility: MEDICAL CENTER | Age: 42
End: 2022-02-03
Payer: COMMERCIAL

## 2022-02-03 LAB
ALBUMIN SERPL BCP-MCNC: 4 G/DL (ref 3.5–5)
ALP SERPL-CCNC: 97 U/L (ref 46–116)
ALT SERPL W P-5'-P-CCNC: 39 U/L (ref 12–78)
ANION GAP SERPL CALCULATED.3IONS-SCNC: 5 MMOL/L (ref 4–13)
AST SERPL W P-5'-P-CCNC: 22 U/L (ref 5–45)
BASOPHILS # BLD AUTO: 0.08 THOUSANDS/ΜL (ref 0–0.1)
BASOPHILS NFR BLD AUTO: 1 % (ref 0–1)
BILIRUB SERPL-MCNC: 0.54 MG/DL (ref 0.2–1)
BUN SERPL-MCNC: 11 MG/DL (ref 5–25)
CALCIUM SERPL-MCNC: 9.7 MG/DL (ref 8.3–10.1)
CHLORIDE SERPL-SCNC: 105 MMOL/L (ref 100–108)
CHOLEST SERPL-MCNC: 221 MG/DL
CO2 SERPL-SCNC: 28 MMOL/L (ref 21–32)
CREAT SERPL-MCNC: 1.05 MG/DL (ref 0.6–1.3)
EOSINOPHIL # BLD AUTO: 0.3 THOUSAND/ΜL (ref 0–0.61)
EOSINOPHIL NFR BLD AUTO: 4 % (ref 0–6)
ERYTHROCYTE [DISTWIDTH] IN BLOOD BY AUTOMATED COUNT: 14.1 % (ref 11.6–15.1)
GFR SERPL CREATININE-BSD FRML MDRD: 88 ML/MIN/1.73SQ M
GLUCOSE P FAST SERPL-MCNC: 110 MG/DL (ref 65–99)
HCT VFR BLD AUTO: 49.1 % (ref 36.5–49.3)
HDLC SERPL-MCNC: 35 MG/DL
HGB BLD-MCNC: 15.6 G/DL (ref 12–17)
IMM GRANULOCYTES # BLD AUTO: 0.02 THOUSAND/UL (ref 0–0.2)
IMM GRANULOCYTES NFR BLD AUTO: 0 % (ref 0–2)
LDLC SERPL CALC-MCNC: 124 MG/DL (ref 0–100)
LYMPHOCYTES # BLD AUTO: 2.73 THOUSANDS/ΜL (ref 0.6–4.47)
LYMPHOCYTES NFR BLD AUTO: 36 % (ref 14–44)
MCH RBC QN AUTO: 26.9 PG (ref 26.8–34.3)
MCHC RBC AUTO-ENTMCNC: 31.8 G/DL (ref 31.4–37.4)
MCV RBC AUTO: 85 FL (ref 82–98)
MONOCYTES # BLD AUTO: 0.62 THOUSAND/ΜL (ref 0.17–1.22)
MONOCYTES NFR BLD AUTO: 8 % (ref 4–12)
NEUTROPHILS # BLD AUTO: 3.84 THOUSANDS/ΜL (ref 1.85–7.62)
NEUTS SEG NFR BLD AUTO: 51 % (ref 43–75)
NRBC BLD AUTO-RTO: 0 /100 WBCS
PLATELET # BLD AUTO: 216 THOUSANDS/UL (ref 149–390)
PMV BLD AUTO: 11.3 FL (ref 8.9–12.7)
POTASSIUM SERPL-SCNC: 4.2 MMOL/L (ref 3.5–5.3)
PROT SERPL-MCNC: 7.7 G/DL (ref 6.4–8.2)
RBC # BLD AUTO: 5.8 MILLION/UL (ref 3.88–5.62)
SODIUM SERPL-SCNC: 138 MMOL/L (ref 136–145)
TRIGL SERPL-MCNC: 311 MG/DL
TSH SERPL DL<=0.05 MIU/L-ACNC: 2.93 UIU/ML (ref 0.36–3.74)
WBC # BLD AUTO: 7.59 THOUSAND/UL (ref 4.31–10.16)

## 2022-02-03 PROCEDURE — 36415 COLL VENOUS BLD VENIPUNCTURE: CPT | Performed by: FAMILY MEDICINE

## 2022-02-03 PROCEDURE — 80061 LIPID PANEL: CPT | Performed by: FAMILY MEDICINE

## 2022-02-03 PROCEDURE — 85025 COMPLETE CBC W/AUTO DIFF WBC: CPT | Performed by: FAMILY MEDICINE

## 2022-02-03 PROCEDURE — 80053 COMPREHEN METABOLIC PANEL: CPT | Performed by: FAMILY MEDICINE

## 2022-02-03 PROCEDURE — 84443 ASSAY THYROID STIM HORMONE: CPT | Performed by: FAMILY MEDICINE

## 2022-02-21 DIAGNOSIS — G43.109 MIGRAINE WITH AURA AND WITHOUT STATUS MIGRAINOSUS, NOT INTRACTABLE: ICD-10-CM

## 2022-02-21 RX ORDER — PROPRANOLOL HYDROCHLORIDE 20 MG/1
20 TABLET ORAL EVERY 12 HOURS SCHEDULED
Qty: 60 TABLET | Refills: 2 | Status: SHIPPED | OUTPATIENT
Start: 2022-02-21 | End: 2022-05-19 | Stop reason: SDUPTHER

## 2022-04-20 DIAGNOSIS — K44.9 HIATAL HERNIA: ICD-10-CM

## 2022-04-20 DIAGNOSIS — K21.00 GASTROESOPHAGEAL REFLUX DISEASE WITH ESOPHAGITIS WITHOUT HEMORRHAGE: ICD-10-CM

## 2022-04-20 RX ORDER — OMEPRAZOLE 20 MG/1
20 CAPSULE, DELAYED RELEASE ORAL
Qty: 30 CAPSULE | Refills: 3 | Status: SHIPPED | OUTPATIENT
Start: 2022-04-20

## 2022-05-19 DIAGNOSIS — G43.109 MIGRAINE WITH AURA AND WITHOUT STATUS MIGRAINOSUS, NOT INTRACTABLE: ICD-10-CM

## 2022-05-19 RX ORDER — PROPRANOLOL HYDROCHLORIDE 20 MG/1
20 TABLET ORAL EVERY 12 HOURS SCHEDULED
Qty: 60 TABLET | Refills: 2 | Status: SHIPPED | OUTPATIENT
Start: 2022-05-19

## 2022-06-08 ENCOUNTER — OFFICE VISIT (OUTPATIENT)
Dept: FAMILY MEDICINE CLINIC | Facility: CLINIC | Age: 42
End: 2022-06-08
Payer: COMMERCIAL

## 2022-06-08 VITALS
HEIGHT: 65 IN | BODY MASS INDEX: 31.79 KG/M2 | WEIGHT: 190.8 LBS | SYSTOLIC BLOOD PRESSURE: 140 MMHG | DIASTOLIC BLOOD PRESSURE: 96 MMHG | TEMPERATURE: 98.2 F | HEART RATE: 82 BPM | OXYGEN SATURATION: 97 %

## 2022-06-08 DIAGNOSIS — Z72.0 TOBACCO ABUSE: ICD-10-CM

## 2022-06-08 DIAGNOSIS — R73.09 ELEVATED GLUCOSE: ICD-10-CM

## 2022-06-08 DIAGNOSIS — E78.5 DYSLIPIDEMIA: ICD-10-CM

## 2022-06-08 DIAGNOSIS — M51.16 LUMBAR DISC HERNIATION WITH RADICULOPATHY: Primary | ICD-10-CM

## 2022-06-08 PROBLEM — Z98.890 S/P LUMBAR DISCECTOMY: Status: ACTIVE | Noted: 2022-04-07

## 2022-06-08 PROCEDURE — 3008F BODY MASS INDEX DOCD: CPT | Performed by: FAMILY MEDICINE

## 2022-06-08 PROCEDURE — 4004F PT TOBACCO SCREEN RCVD TLK: CPT | Performed by: FAMILY MEDICINE

## 2022-06-08 PROCEDURE — 99214 OFFICE O/P EST MOD 30 MIN: CPT | Performed by: FAMILY MEDICINE

## 2022-06-08 NOTE — PROGRESS NOTES
Assessment/Plan:  Patient will continue to try to watch his diet  Patient will call his workman's comp about getting another opinion on his back  Suggested to patient to cut back hopefully quit smoking  Follow-up in 4 months, getting blood work before that visit  Tobacco Cessation Counseling: Tobacco cessation counseling and education was provided  The patient is sincerely urged to quit consumption of tobacco  He is not ready to quit tobacco  The numerous health risks of tobacco consumption were discussed  If he decides to quit, there are a number of helpful adjunctive aids, and he can see me to discuss nicotine replacement therapy, chantix, or bupropion anytime in the future  Problem List Items Addressed This Visit        Nervous and Auditory    Lumbar disc herniation with radiculopathy - Primary       Other    Tobacco abuse    Dyslipidemia    Relevant Orders    Comprehensive metabolic panel    Lipid Panel with Direct LDL reflex      Other Visit Diagnoses     Elevated glucose        Relevant Orders    Hemoglobin A1C           Diagnoses and all orders for this visit:    Lumbar disc herniation with radiculopathy    Tobacco abuse    Dyslipidemia  -     Comprehensive metabolic panel; Future  -     Lipid Panel with Direct LDL reflex; Future    Elevated glucose  -     Hemoglobin A1C; Future        No problem-specific Assessment & Plan notes found for this encounter  Subjective:      Patient ID: Charles Ramirez is a 37 y o  male  Here today for follow-up  Patient has recent blood work showed elevated triglycerides and mildly elevated LDL  Patient saw his neurosurgeon, they are suggesting spinal fusion  They told him that this would be his final surgery, and and might only last about 15 years  Patient is still smoking about a half pack a day        The following portions of the patient's history were reviewed and updated as appropriate:   He has a past medical history of Diarrhea ,  does not have any pertinent problems on file  ,   has a past surgical history that includes Back surgery; Finger surgery (Right); and Finger amputation (Right)  ,  family history includes Breast cancer in his mother; Diabetes in his cousin; Hypertension in his mother  ,   reports that he has been smoking  He has been smoking about 0 50 packs per day  He has never used smokeless tobacco  He reports previous alcohol use  He reports that he does not use drugs  ,  is allergic to strawberry extract - food allergy     Current Outpatient Medications   Medication Sig Dispense Refill    hyoscyamine (LEVSIN/SL) 0 125 mg SL tablet Take 1 tablet (0 125 mg total) by mouth every 6 (six) hours as needed for cramping 60 tablet 3    omeprazole (PriLOSEC) 20 mg delayed release capsule Take 1 capsule (20 mg total) by mouth daily before breakfast 30 capsule 3    propranolol (INDERAL) 20 mg tablet Take 1 tablet (20 mg total) by mouth every 12 (twelve) hours 60 tablet 2     No current facility-administered medications for this visit  Review of Systems   Constitutional: Negative  Respiratory: Negative  Cardiovascular: Negative  Gastrointestinal: Negative  Genitourinary: Negative  Objective:  Vitals:    06/08/22 0902   BP: 144/92   Pulse: 82   Temp: 98 2 °F (36 8 °C)   SpO2: 97%   Weight: 86 5 kg (190 lb 12 8 oz)   Height: 5' 5" (1 651 m)     Body mass index is 31 75 kg/m²  Physical Exam  Vitals reviewed  Constitutional:       General: He is not in acute distress  Appearance: Normal appearance  He is well-developed  He is not ill-appearing, toxic-appearing or diaphoretic  HENT:      Head: Normocephalic and atraumatic  Eyes:      Conjunctiva/sclera: Conjunctivae normal    Cardiovascular:      Rate and Rhythm: Normal rate and regular rhythm  Heart sounds: Normal heart sounds  No murmur heard  No friction rub  No gallop  Pulmonary:      Effort: Pulmonary effort is normal  No respiratory distress        Breath sounds: Normal breath sounds  No wheezing, rhonchi or rales  Musculoskeletal:      Right lower leg: No edema  Left lower leg: No edema  Neurological:      General: No focal deficit present  Mental Status: He is alert and oriented to person, place, and time  Psychiatric:         Mood and Affect: Mood normal          Behavior: Behavior normal          Thought Content:  Thought content normal          Judgment: Judgment normal  50

## 2022-08-02 ENCOUNTER — APPOINTMENT (OUTPATIENT)
Dept: LAB | Facility: MEDICAL CENTER | Age: 42
End: 2022-08-02
Payer: COMMERCIAL

## 2022-08-02 DIAGNOSIS — E78.5 DYSLIPIDEMIA: ICD-10-CM

## 2022-08-02 DIAGNOSIS — R73.09 ELEVATED GLUCOSE: ICD-10-CM

## 2022-08-02 LAB
ALBUMIN SERPL BCP-MCNC: 3.7 G/DL (ref 3.5–5)
ALP SERPL-CCNC: 93 U/L (ref 46–116)
ALT SERPL W P-5'-P-CCNC: 35 U/L (ref 12–78)
ANION GAP SERPL CALCULATED.3IONS-SCNC: 1 MMOL/L (ref 4–13)
AST SERPL W P-5'-P-CCNC: 22 U/L (ref 5–45)
BILIRUB SERPL-MCNC: 0.55 MG/DL (ref 0.2–1)
BUN SERPL-MCNC: 14 MG/DL (ref 5–25)
CALCIUM SERPL-MCNC: 9.1 MG/DL (ref 8.3–10.1)
CHLORIDE SERPL-SCNC: 108 MMOL/L (ref 96–108)
CHOLEST SERPL-MCNC: 216 MG/DL
CO2 SERPL-SCNC: 29 MMOL/L (ref 21–32)
CREAT SERPL-MCNC: 1.14 MG/DL (ref 0.6–1.3)
EST. AVERAGE GLUCOSE BLD GHB EST-MCNC: 126 MG/DL
GFR SERPL CREATININE-BSD FRML MDRD: 79 ML/MIN/1.73SQ M
GLUCOSE P FAST SERPL-MCNC: 103 MG/DL (ref 65–99)
HBA1C MFR BLD: 6 %
HDLC SERPL-MCNC: 28 MG/DL
LDLC SERPL DIRECT ASSAY-MCNC: 142 MG/DL (ref 0–100)
POTASSIUM SERPL-SCNC: 4.2 MMOL/L (ref 3.5–5.3)
PROT SERPL-MCNC: 7.3 G/DL (ref 6.4–8.4)
SODIUM SERPL-SCNC: 138 MMOL/L (ref 135–147)
TRIGL SERPL-MCNC: 427 MG/DL

## 2022-08-02 PROCEDURE — 36415 COLL VENOUS BLD VENIPUNCTURE: CPT

## 2022-08-02 PROCEDURE — 83721 ASSAY OF BLOOD LIPOPROTEIN: CPT

## 2022-08-02 PROCEDURE — 80061 LIPID PANEL: CPT

## 2022-08-02 PROCEDURE — 83036 HEMOGLOBIN GLYCOSYLATED A1C: CPT

## 2022-08-02 PROCEDURE — 80053 COMPREHEN METABOLIC PANEL: CPT

## 2022-08-03 DIAGNOSIS — E78.5 DYSLIPIDEMIA: Primary | ICD-10-CM

## 2022-08-03 RX ORDER — ATORVASTATIN CALCIUM 20 MG/1
20 TABLET, FILM COATED ORAL DAILY
Qty: 30 TABLET | Refills: 5 | Status: SHIPPED | OUTPATIENT
Start: 2022-08-03

## 2022-08-26 DIAGNOSIS — K21.00 GASTROESOPHAGEAL REFLUX DISEASE WITH ESOPHAGITIS WITHOUT HEMORRHAGE: ICD-10-CM

## 2022-08-26 DIAGNOSIS — G43.109 MIGRAINE WITH AURA AND WITHOUT STATUS MIGRAINOSUS, NOT INTRACTABLE: ICD-10-CM

## 2022-08-26 DIAGNOSIS — K44.9 HIATAL HERNIA: ICD-10-CM

## 2022-08-26 RX ORDER — OMEPRAZOLE 20 MG/1
20 CAPSULE, DELAYED RELEASE ORAL
Qty: 30 CAPSULE | Refills: 2 | Status: SHIPPED | OUTPATIENT
Start: 2022-08-26

## 2022-08-26 RX ORDER — PROPRANOLOL HYDROCHLORIDE 20 MG/1
20 TABLET ORAL EVERY 12 HOURS SCHEDULED
Qty: 60 TABLET | Refills: 2 | Status: SHIPPED | OUTPATIENT
Start: 2022-08-26

## 2022-08-27 ENCOUNTER — APPOINTMENT (EMERGENCY)
Dept: CT IMAGING | Facility: HOSPITAL | Age: 42
End: 2022-08-27
Payer: COMMERCIAL

## 2022-08-27 ENCOUNTER — HOSPITAL ENCOUNTER (EMERGENCY)
Facility: HOSPITAL | Age: 42
Discharge: HOME | End: 2022-08-27
Attending: EMERGENCY MEDICINE
Payer: COMMERCIAL

## 2022-08-27 ENCOUNTER — APPOINTMENT (OUTPATIENT)
Dept: RADIOLOGY | Facility: HOSPITAL | Age: 42
End: 2022-08-27
Payer: COMMERCIAL

## 2022-08-27 VITALS
DIASTOLIC BLOOD PRESSURE: 59 MMHG | WEIGHT: 186.95 LBS | BODY MASS INDEX: 31.11 KG/M2 | TEMPERATURE: 100 F | OXYGEN SATURATION: 92 % | HEART RATE: 85 BPM | SYSTOLIC BLOOD PRESSURE: 105 MMHG | RESPIRATION RATE: 20 BRPM

## 2022-08-27 DIAGNOSIS — R55 SYNCOPE AND COLLAPSE: Primary | ICD-10-CM

## 2022-08-27 DIAGNOSIS — R11.2 NAUSEA AND VOMITING IN ADULT: ICD-10-CM

## 2022-08-27 DIAGNOSIS — S09.90XA MINOR HEAD TRAUMA: ICD-10-CM

## 2022-08-27 DIAGNOSIS — F07.81 CONCUSSION SYNDROME: ICD-10-CM

## 2022-08-27 LAB
ALBUMIN SERPL BCP-MCNC: 4.1 G/DL (ref 3.5–5)
ALP SERPL-CCNC: 108 U/L (ref 46–116)
ALT SERPL W P-5'-P-CCNC: 50 U/L (ref 12–78)
ANION GAP SERPL CALCULATED.3IONS-SCNC: 14 MMOL/L (ref 4–13)
AST SERPL W P-5'-P-CCNC: 24 U/L (ref 5–45)
ATRIAL RATE: 76 BPM
ATRIAL RATE: 77 BPM
BASOPHILS # BLD AUTO: 0.1 THOUSANDS/ΜL (ref 0–0.1)
BASOPHILS NFR BLD AUTO: 1 % (ref 0–1)
BILIRUB SERPL-MCNC: 0.86 MG/DL (ref 0.2–1)
BUN SERPL-MCNC: 12 MG/DL (ref 5–25)
CALCIUM SERPL-MCNC: 9.4 MG/DL (ref 8.3–10.1)
CARDIAC TROPONIN I PNL SERPL HS: <2 NG/L
CARDIAC TROPONIN I PNL SERPL HS: <2 NG/L
CHLORIDE SERPL-SCNC: 100 MMOL/L (ref 96–108)
CO2 SERPL-SCNC: 26 MMOL/L (ref 21–32)
CREAT SERPL-MCNC: 1.35 MG/DL (ref 0.6–1.3)
D DIMER PPP FEU-MCNC: 0.31 UG/ML FEU
EOSINOPHIL # BLD AUTO: 0.17 THOUSAND/ΜL (ref 0–0.61)
EOSINOPHIL NFR BLD AUTO: 1 % (ref 0–6)
ERYTHROCYTE [DISTWIDTH] IN BLOOD BY AUTOMATED COUNT: 13.4 % (ref 11.6–15.1)
GFR SERPL CREATININE-BSD FRML MDRD: 64 ML/MIN/1.73SQ M
GLUCOSE SERPL-MCNC: 139 MG/DL (ref 65–140)
HCT VFR BLD AUTO: 47.9 % (ref 36.5–49.3)
HGB BLD-MCNC: 15.7 G/DL (ref 12–17)
IMM GRANULOCYTES # BLD AUTO: 0.04 THOUSAND/UL (ref 0–0.2)
IMM GRANULOCYTES NFR BLD AUTO: 0 % (ref 0–2)
LYMPHOCYTES # BLD AUTO: 3.39 THOUSANDS/ΜL (ref 0.6–4.47)
LYMPHOCYTES NFR BLD AUTO: 25 % (ref 14–44)
MCH RBC QN AUTO: 27.5 PG (ref 26.8–34.3)
MCHC RBC AUTO-ENTMCNC: 32.8 G/DL (ref 31.4–37.4)
MCV RBC AUTO: 84 FL (ref 82–98)
MONOCYTES # BLD AUTO: 0.76 THOUSAND/ΜL (ref 0.17–1.22)
MONOCYTES NFR BLD AUTO: 6 % (ref 4–12)
NEUTROPHILS # BLD AUTO: 8.93 THOUSANDS/ΜL (ref 1.85–7.62)
NEUTS SEG NFR BLD AUTO: 67 % (ref 43–75)
NRBC BLD AUTO-RTO: 0 /100 WBCS
P AXIS: 53 DEGREES
P AXIS: 59 DEGREES
PLATELET # BLD AUTO: 236 THOUSANDS/UL (ref 149–390)
PMV BLD AUTO: 10.2 FL (ref 8.9–12.7)
POTASSIUM SERPL-SCNC: 4.2 MMOL/L (ref 3.5–5.3)
PR INTERVAL: 162 MS
PR INTERVAL: 166 MS
PROT SERPL-MCNC: 7.9 G/DL (ref 6.4–8.4)
QRS AXIS: 61 DEGREES
QRS AXIS: 85 DEGREES
QRSD INTERVAL: 80 MS
QRSD INTERVAL: 82 MS
QT INTERVAL: 376 MS
QT INTERVAL: 384 MS
QTC INTERVAL: 425 MS
QTC INTERVAL: 432 MS
RBC # BLD AUTO: 5.71 MILLION/UL (ref 3.88–5.62)
SODIUM SERPL-SCNC: 140 MMOL/L (ref 135–147)
T WAVE AXIS: 58 DEGREES
T WAVE AXIS: 67 DEGREES
VENTRICULAR RATE: 76 BPM
VENTRICULAR RATE: 77 BPM
WBC # BLD AUTO: 13.39 THOUSAND/UL (ref 4.31–10.16)

## 2022-08-27 PROCEDURE — 99284 EMERGENCY DEPT VISIT MOD MDM: CPT

## 2022-08-27 PROCEDURE — 93005 ELECTROCARDIOGRAM TRACING: CPT

## 2022-08-27 PROCEDURE — 71045 X-RAY EXAM CHEST 1 VIEW: CPT

## 2022-08-27 PROCEDURE — 84484 ASSAY OF TROPONIN QUANT: CPT | Performed by: EMERGENCY MEDICINE

## 2022-08-27 PROCEDURE — 99285 EMERGENCY DEPT VISIT HI MDM: CPT | Performed by: EMERGENCY MEDICINE

## 2022-08-27 PROCEDURE — 96361 HYDRATE IV INFUSION ADD-ON: CPT

## 2022-08-27 PROCEDURE — 85379 FIBRIN DEGRADATION QUANT: CPT | Performed by: EMERGENCY MEDICINE

## 2022-08-27 PROCEDURE — 96374 THER/PROPH/DIAG INJ IV PUSH: CPT

## 2022-08-27 PROCEDURE — 85025 COMPLETE CBC W/AUTO DIFF WBC: CPT | Performed by: EMERGENCY MEDICINE

## 2022-08-27 PROCEDURE — 80053 COMPREHEN METABOLIC PANEL: CPT | Performed by: EMERGENCY MEDICINE

## 2022-08-27 PROCEDURE — 70450 CT HEAD/BRAIN W/O DYE: CPT

## 2022-08-27 PROCEDURE — G1004 CDSM NDSC: HCPCS

## 2022-08-27 PROCEDURE — 36415 COLL VENOUS BLD VENIPUNCTURE: CPT | Performed by: EMERGENCY MEDICINE

## 2022-08-27 RX ORDER — ONDANSETRON 4 MG/1
4 TABLET, ORALLY DISINTEGRATING ORAL EVERY 6 HOURS PRN
Qty: 20 TABLET | Refills: 0 | Status: SHIPPED | OUTPATIENT
Start: 2022-08-27 | End: 2022-09-20

## 2022-08-27 RX ORDER — IBUPROFEN 600 MG/1
600 TABLET ORAL EVERY 6 HOURS PRN
Qty: 30 TABLET | Refills: 0 | Status: SHIPPED | OUTPATIENT
Start: 2022-08-27 | End: 2022-09-20

## 2022-08-27 RX ORDER — ONDANSETRON 2 MG/ML
4 INJECTION INTRAMUSCULAR; INTRAVENOUS ONCE
Status: COMPLETED | OUTPATIENT
Start: 2022-08-27 | End: 2022-08-27

## 2022-08-27 RX ADMIN — SODIUM CHLORIDE 1000 ML: 0.9 INJECTION, SOLUTION INTRAVENOUS at 00:52

## 2022-08-27 RX ADMIN — ONDANSETRON 4 MG: 2 INJECTION INTRAMUSCULAR; INTRAVENOUS at 00:52

## 2022-08-27 NOTE — DISCHARGE INSTRUCTIONS
You have been seen for evaluation after a syncopal episode  Your symptoms may be related to a concussion  Please take the prescribed motrin and zofran as needed for your symptoms  Return to the emergency department if you develop recurrent syncopal episode, chest pain, trouble breathing, headaches, weakness/numbness/tingling in your extremities or any other symptoms of concern  Please follow up with your PCP and the concussion clinic by calling the number provided

## 2022-08-30 LAB
ATRIAL RATE: 76 BPM
ATRIAL RATE: 77 BPM
P AXIS: 53 DEGREES
P AXIS: 59 DEGREES
PR INTERVAL: 162 MS
PR INTERVAL: 166 MS
QRS AXIS: 61 DEGREES
QRS AXIS: 85 DEGREES
QRSD INTERVAL: 80 MS
QRSD INTERVAL: 82 MS
QT INTERVAL: 376 MS
QT INTERVAL: 384 MS
QTC INTERVAL: 425 MS
QTC INTERVAL: 432 MS
T WAVE AXIS: 58 DEGREES
T WAVE AXIS: 67 DEGREES
VENTRICULAR RATE: 76 BPM
VENTRICULAR RATE: 77 BPM

## 2022-08-30 PROCEDURE — 93010 ELECTROCARDIOGRAM REPORT: CPT | Performed by: INTERNAL MEDICINE

## 2022-09-20 ENCOUNTER — CONSULT (OUTPATIENT)
Dept: FAMILY MEDICINE CLINIC | Facility: CLINIC | Age: 42
End: 2022-09-20
Payer: COMMERCIAL

## 2022-09-20 VITALS
WEIGHT: 184.4 LBS | HEIGHT: 65 IN | SYSTOLIC BLOOD PRESSURE: 122 MMHG | HEART RATE: 81 BPM | BODY MASS INDEX: 30.72 KG/M2 | OXYGEN SATURATION: 98 % | DIASTOLIC BLOOD PRESSURE: 84 MMHG | TEMPERATURE: 98.8 F

## 2022-09-20 DIAGNOSIS — Z01.818 PRE-OP EXAMINATION: Primary | ICD-10-CM

## 2022-09-20 DIAGNOSIS — Z72.0 TOBACCO ABUSE: ICD-10-CM

## 2022-09-20 DIAGNOSIS — K21.00 GASTROESOPHAGEAL REFLUX DISEASE WITH ESOPHAGITIS WITHOUT HEMORRHAGE: ICD-10-CM

## 2022-09-20 DIAGNOSIS — G43.109 MIGRAINE WITH AURA AND WITHOUT STATUS MIGRAINOSUS, NOT INTRACTABLE: ICD-10-CM

## 2022-09-20 DIAGNOSIS — M54.16 LUMBAR RADICULOPATHY: ICD-10-CM

## 2022-09-20 DIAGNOSIS — M51.16 LUMBAR DISC HERNIATION WITH RADICULOPATHY: ICD-10-CM

## 2022-09-20 PROCEDURE — 99243 OFF/OP CNSLTJ NEW/EST LOW 30: CPT | Performed by: FAMILY MEDICINE

## 2022-09-20 PROCEDURE — 3725F SCREEN DEPRESSION PERFORMED: CPT | Performed by: FAMILY MEDICINE

## 2022-09-20 NOTE — PROGRESS NOTES
Subjective:     Terrence Seip is a 40 y o  male who presents to the office today for a preoperative consultation at the request of surgeon Dr Wilner Sharp who plans on performing Lumbar fusion on September 23  This consultation is requested for the specific conditions prompting preoperative evaluation (i e  because of potential affect on operative risk): GERD, Migraines  Planned anesthesia: general  The patient has the following known anesthesia issues: None  Patients bleeding risk: no recent abnormal bleeding  The following portions of the patient's history were reviewed and updated as appropriate:   He  has a past medical history of Diarrhea  He   Patient Active Problem List    Diagnosis Date Noted    Tobacco abuse 06/08/2022    Dyslipidemia 06/08/2022    S/P lumbar discectomy 04/07/2022    Hiatal hernia 12/22/2021    Gastroesophageal reflux disease with esophagitis without hemorrhage 12/22/2021    Migraine with aura and without status migrainosus, not intractable 12/22/2021    Lumbar disc herniation with radiculopathy 04/12/2016    Lumbar radiculopathy 04/05/2016    Disc degeneration, lumbar 11/28/2014     He  has a past surgical history that includes Back surgery; Finger surgery (Right); and Finger amputation (Right)  His family history includes Breast cancer in his mother; Diabetes in his cousin; Hypertension in his mother  He  reports that he has been smoking  He has been smoking about 0 50 packs per day  He has never used smokeless tobacco  He reports previous alcohol use  He reports that he does not use drugs    Current Outpatient Medications   Medication Sig Dispense Refill    atorvastatin (LIPITOR) 20 mg tablet Take 1 tablet (20 mg total) by mouth daily 30 tablet 5    omeprazole (PriLOSEC) 20 mg delayed release capsule Take 1 capsule (20 mg total) by mouth daily before breakfast 30 capsule 2    propranolol (INDERAL) 20 mg tablet Take 1 tablet (20 mg total) by mouth every 12 (twelve) hours 60 tablet 2     No current facility-administered medications for this visit  Current Outpatient Medications on File Prior to Visit   Medication Sig    atorvastatin (LIPITOR) 20 mg tablet Take 1 tablet (20 mg total) by mouth daily    omeprazole (PriLOSEC) 20 mg delayed release capsule Take 1 capsule (20 mg total) by mouth daily before breakfast    propranolol (INDERAL) 20 mg tablet Take 1 tablet (20 mg total) by mouth every 12 (twelve) hours    [DISCONTINUED] hyoscyamine (LEVSIN/SL) 0 125 mg SL tablet Take 1 tablet (0 125 mg total) by mouth every 6 (six) hours as needed for cramping (Patient not taking: Reported on 9/20/2022)    [DISCONTINUED] ibuprofen (MOTRIN) 600 mg tablet Take 1 tablet (600 mg total) by mouth every 6 (six) hours as needed for mild pain (Patient not taking: Reported on 9/20/2022)    [DISCONTINUED] ondansetron (ZOFRAN-ODT) 4 mg disintegrating tablet Take 1 tablet (4 mg total) by mouth every 6 (six) hours as needed for nausea or vomiting (Patient not taking: Reported on 9/20/2022)     No current facility-administered medications on file prior to visit  He is allergic to strawberry extract - food allergy       Review of Systems  Pertinent items are noted in HPI  Objective:  Physical Exam  Vitals reviewed  Constitutional:       General: He is not in acute distress  Appearance: Normal appearance  He is well-developed  He is not ill-appearing, toxic-appearing or diaphoretic  HENT:      Head: Normocephalic and atraumatic  Eyes:      Conjunctiva/sclera: Conjunctivae normal    Cardiovascular:      Rate and Rhythm: Normal rate and regular rhythm  Heart sounds: Normal heart sounds  No murmur heard  No friction rub  No gallop  Pulmonary:      Effort: Pulmonary effort is normal  No respiratory distress  Breath sounds: Normal breath sounds  No wheezing, rhonchi or rales  Musculoskeletal:      Right lower leg: No edema  Left lower leg: No edema     Neurological: General: No focal deficit present  Mental Status: He is alert and oriented to person, place, and time  Psychiatric:         Mood and Affect: Mood normal          Behavior: Behavior normal          Thought Content:  Thought content normal          Judgment: Judgment normal          Cardiographics  ECG: normal sinus rhythm, no blocks or conduction defects, no ischemic changes  Echocardiogram: not done    Imaging  Chest x-ray: normal     Lab Review   Admission on 08/27/2022, Discharged on 08/27/2022   Component Date Value    WBC 08/27/2022 13 39 (A)    RBC 08/27/2022 5 71 (A)    Hemoglobin 08/27/2022 15 7     Hematocrit 08/27/2022 47 9     MCV 08/27/2022 84     MCH 08/27/2022 27 5     MCHC 08/27/2022 32 8     RDW 08/27/2022 13 4     MPV 08/27/2022 10 2     Platelets 95/36/3218 236     nRBC 08/27/2022 0     Neutrophils Relative 08/27/2022 67     Immat GRANS % 08/27/2022 0     Lymphocytes Relative 08/27/2022 25     Monocytes Relative 08/27/2022 6     Eosinophils Relative 08/27/2022 1     Basophils Relative 08/27/2022 1     Neutrophils Absolute 08/27/2022 8 93 (A)    Immature Grans Absolute 08/27/2022 0 04     Lymphocytes Absolute 08/27/2022 3 39     Monocytes Absolute 08/27/2022 0 76     Eosinophils Absolute 08/27/2022 0 17     Basophils Absolute 08/27/2022 0 10     Sodium 08/27/2022 140     Potassium 08/27/2022 4 2     Chloride 08/27/2022 100     CO2 08/27/2022 26     ANION GAP 08/27/2022 14 (A)    BUN 08/27/2022 12     Creatinine 08/27/2022 1 35 (A)    Glucose 08/27/2022 139     Calcium 08/27/2022 9 4     AST 08/27/2022 24     ALT 08/27/2022 50     Alkaline Phosphatase 08/27/2022 108     Total Protein 08/27/2022 7 9     Albumin 08/27/2022 4 1     Total Bilirubin 08/27/2022 0 86     eGFR 08/27/2022 64     hs TnI 0hr 08/27/2022 <2     D-Dimer, Quant 08/27/2022 0 31     Ventricular Rate 08/27/2022 76     Atrial Rate 08/27/2022 76     PA Interval 08/27/2022 162     QRSD Interval 08/27/2022 80     QT Interval 08/27/2022 384     QTC Interval 08/27/2022 432     P Axis 08/27/2022 59     QRS Axis 08/27/2022 85     T Wave Axis 08/27/2022 67     hs TnI 2hr 08/27/2022 <2     Delta 2hr hsTnI 08/27/2022      Ventricular Rate 08/27/2022 77     Atrial Rate 08/27/2022 77     MI Interval 08/27/2022 166     QRSD Interval 08/27/2022 82     QT Interval 08/27/2022 376     QTC Interval 08/27/2022 425     P Axis 08/27/2022 53     QRS Axis 08/27/2022 61     T Wave [place] 08/27/2022 58    Appointment on 08/02/2022   Component Date Value    Sodium 08/02/2022 138     Potassium 08/02/2022 4 2     Chloride 08/02/2022 108     CO2 08/02/2022 29     ANION GAP 08/02/2022 1 (A)    BUN 08/02/2022 14     Creatinine 08/02/2022 1 14     Glucose, Fasting 08/02/2022 103 (A)    Calcium 08/02/2022 9 1     AST 08/02/2022 22     ALT 08/02/2022 35     Alkaline Phosphatase 08/02/2022 93     Total Protein 08/02/2022 7 3     Albumin 08/02/2022 3 7     Total Bilirubin 08/02/2022 0 55     eGFR 08/02/2022 79     Cholesterol 08/02/2022 216 (A)    Triglycerides 08/02/2022 427 (A)    HDL, Direct 08/02/2022 28 (A)    LDL Calculated 08/02/2022      Hemoglobin A1C 08/02/2022 6 0 (A)    EAG 08/02/2022 126     LDL Direct 08/02/2022 142 (A)        Assessment:     40 y o  male with planned surgery as above  Known risk factors for perioperative complications: None    Difficulty with intubation is not anticipated  Current medications which may produce withdrawal symptoms if withheld perioperatively: none      Plan:  Medically cleared for Lumbar fusion surgery on 9/23/2022 by Dr Jake Enciso  Follow up here PRN and as scheduled  Note faxed to Dr Douglass [facility] office [phone]   1  Preoperative workup as follows ECG, hemoglobin, hematocrit, electrolytes, creatinine, glucose, coagulation studies    2  Change in medication regimen before surgery: none, continue medication regimen including morning of surgery, with sip of water    3  Deep vein thrombosis prophylaxis postoperatively:regimen to be chosen by surgical team   4  Other measures: none

## 2022-11-11 ENCOUNTER — OFFICE VISIT (OUTPATIENT)
Dept: FAMILY MEDICINE CLINIC | Facility: CLINIC | Age: 42
End: 2022-11-11

## 2022-11-11 VITALS
DIASTOLIC BLOOD PRESSURE: 66 MMHG | OXYGEN SATURATION: 97 % | HEIGHT: 65 IN | SYSTOLIC BLOOD PRESSURE: 122 MMHG | WEIGHT: 188.7 LBS | BODY MASS INDEX: 31.44 KG/M2 | HEART RATE: 79 BPM

## 2022-11-11 DIAGNOSIS — Z23 NEED FOR INFLUENZA VACCINATION: ICD-10-CM

## 2022-11-11 DIAGNOSIS — Z72.0 TOBACCO ABUSE: ICD-10-CM

## 2022-11-11 DIAGNOSIS — M51.16 LUMBAR DISC HERNIATION WITH RADICULOPATHY: ICD-10-CM

## 2022-11-11 DIAGNOSIS — M51.36 DISC DEGENERATION, LUMBAR: ICD-10-CM

## 2022-11-11 DIAGNOSIS — E78.5 DYSLIPIDEMIA: Primary | ICD-10-CM

## 2022-11-11 DIAGNOSIS — M54.16 LUMBAR RADICULOPATHY: ICD-10-CM

## 2022-11-11 NOTE — PROGRESS NOTES
Assessment/Plan:    Problem List Items Addressed This Visit        Nervous and Auditory    Lumbar disc herniation with radiculopathy    Lumbar radiculopathy       Musculoskeletal and Integument    Disc degeneration, lumbar       Other    Tobacco abuse    Dyslipidemia - Primary    Relevant Orders    Comprehensive metabolic panel    Lipid panel      Other Visit Diagnoses     Need for influenza vaccination        Relevant Orders    influenza vaccine, quadrivalent, 0 5 mL, preservative-free, for adult and pediatric patients 6 mos+ (AFLURIA, FLUARIX, FLULAVAL, FLUZONE) (Completed)         Diagnoses and all orders for this visit:    Dyslipidemia  -     Comprehensive metabolic panel; Future  -     Lipid panel; Future    Lumbar disc herniation with radiculopathy    Lumbar radiculopathy    Need for influenza vaccination  -     influenza vaccine, quadrivalent, 0 5 mL, preservative-free, for adult and pediatric patients 6 mos+ (AFLURIA, FLUARIX, FLULAVAL, FLUZONE)    Disc degeneration, lumbar    Tobacco abuse      He will continue to follow with neurosurgery    Repeat labs ordered    Follow-up in 6 months or sooner if needed    Subjective:      Patient ID: Juve Sarabia is a 40 y o  male  John Floyd is a pleasant 40year old male who is here today for a follow-up  He is doing well since starting the atorvastatin  He had a spinal fusion and has been doing well  He will follow-up with his neurosurgeon in January  He denies any other concerns at this time  He is still trying to quit smoking  He has cut back to 1/2 PPD  He is triggered by his other family members who smoke  The following portions of the patient's history were reviewed and updated as appropriate:   He has a past medical history of Diarrhea ,  does not have any pertinent problems on file  ,   has a past surgical history that includes Back surgery; Finger surgery (Right); and Finger amputation (Right)  ,  family history includes Breast cancer in his mother; Diabetes in his cousin; Hypertension in his mother  ,   reports that he has been smoking  He has been smoking about 0 50 packs per day  He has never used smokeless tobacco  He reports previous alcohol use  He reports that he does not use drugs  ,  is allergic to strawberry extract - food allergy     Current Outpatient Medications   Medication Sig Dispense Refill   • atorvastatin (LIPITOR) 20 mg tablet Take 1 tablet (20 mg total) by mouth daily 30 tablet 5   • omeprazole (PriLOSEC) 20 mg delayed release capsule Take 1 capsule (20 mg total) by mouth daily before breakfast 30 capsule 2   • propranolol (INDERAL) 20 mg tablet Take 1 tablet (20 mg total) by mouth every 12 (twelve) hours 60 tablet 2     No current facility-administered medications for this visit  Review of Systems   Constitutional: Negative for chills, diaphoresis, fatigue and fever  HENT: Negative for congestion, ear pain, postnasal drip, rhinorrhea, sneezing, sore throat and trouble swallowing  Eyes: Negative for pain and visual disturbance  Respiratory: Negative for apnea, cough, shortness of breath and wheezing  Cardiovascular: Negative for chest pain and palpitations  Gastrointestinal: Negative for abdominal pain, constipation, diarrhea, nausea and vomiting  Genitourinary: Negative for dysuria and hematuria  Musculoskeletal: Positive for back pain  Negative for arthralgias, gait problem and myalgias  Neurological: Negative for dizziness, syncope, weakness, light-headedness, numbness and headaches  Psychiatric/Behavioral: Negative for suicidal ideas  The patient is not nervous/anxious  Objective:  Vitals:    11/11/22 1114   BP: 122/66   Pulse: 79   SpO2: 97%   Weight: 85 6 kg (188 lb 11 2 oz)   Height: 5' 5" (1 651 m)     Body mass index is 31 4 kg/m²  Physical Exam  Vitals and nursing note reviewed  Constitutional:       Appearance: He is well-developed  HENT:      Head: Normocephalic and atraumatic        Right Ear: Tympanic membrane, ear canal and external ear normal       Left Ear: Tympanic membrane, ear canal and external ear normal       Nose: Nose normal       Mouth/Throat:      Pharynx: No oropharyngeal exudate or posterior oropharyngeal erythema  Eyes:      Extraocular Movements: Extraocular movements intact  Cardiovascular:      Rate and Rhythm: Normal rate and regular rhythm  Heart sounds: Normal heart sounds  No murmur heard  No friction rub  No gallop  Pulmonary:      Effort: Pulmonary effort is normal  No respiratory distress  Breath sounds: Normal breath sounds  No wheezing or rales  Musculoskeletal:         General: Normal range of motion  Cervical back: Normal range of motion and neck supple  Lymphadenopathy:      Cervical: No cervical adenopathy  Skin:     General: Skin is warm and dry  Neurological:      Mental Status: He is alert and oriented to person, place, and time  Gait: Gait abnormal (ambulates with cane)  Psychiatric:         Behavior: Behavior normal          Thought Content:  Thought content normal          Judgment: Judgment normal

## 2022-11-29 ENCOUNTER — LAB (OUTPATIENT)
Dept: LAB | Facility: MEDICAL CENTER | Age: 42
End: 2022-11-29

## 2022-11-29 DIAGNOSIS — E78.5 DYSLIPIDEMIA: ICD-10-CM

## 2022-11-29 LAB
ALBUMIN SERPL BCP-MCNC: 4 G/DL (ref 3.5–5)
ALP SERPL-CCNC: 125 U/L (ref 46–116)
ALT SERPL W P-5'-P-CCNC: 38 U/L (ref 12–78)
ANION GAP SERPL CALCULATED.3IONS-SCNC: 5 MMOL/L (ref 4–13)
AST SERPL W P-5'-P-CCNC: 24 U/L (ref 5–45)
BILIRUB SERPL-MCNC: 0.93 MG/DL (ref 0.2–1)
BUN SERPL-MCNC: 12 MG/DL (ref 5–25)
CALCIUM SERPL-MCNC: 9.7 MG/DL (ref 8.3–10.1)
CHLORIDE SERPL-SCNC: 105 MMOL/L (ref 96–108)
CHOLEST SERPL-MCNC: 146 MG/DL
CO2 SERPL-SCNC: 28 MMOL/L (ref 21–32)
CREAT SERPL-MCNC: 1.08 MG/DL (ref 0.6–1.3)
GFR SERPL CREATININE-BSD FRML MDRD: 84 ML/MIN/1.73SQ M
GLUCOSE P FAST SERPL-MCNC: 127 MG/DL (ref 65–99)
HDLC SERPL-MCNC: 34 MG/DL
LDLC SERPL CALC-MCNC: 68 MG/DL (ref 0–100)
NONHDLC SERPL-MCNC: 112 MG/DL
POTASSIUM SERPL-SCNC: 4 MMOL/L (ref 3.5–5.3)
PROT SERPL-MCNC: 7.8 G/DL (ref 6.4–8.4)
SODIUM SERPL-SCNC: 138 MMOL/L (ref 135–147)
TRIGL SERPL-MCNC: 222 MG/DL

## 2022-11-30 DIAGNOSIS — R73.01 ELEVATED FASTING GLUCOSE: Primary | ICD-10-CM

## 2022-12-05 ENCOUNTER — APPOINTMENT (OUTPATIENT)
Dept: LAB | Facility: MEDICAL CENTER | Age: 42
End: 2022-12-05

## 2022-12-05 DIAGNOSIS — R73.01 ELEVATED FASTING GLUCOSE: ICD-10-CM

## 2022-12-06 DIAGNOSIS — K44.9 HIATAL HERNIA: ICD-10-CM

## 2022-12-06 DIAGNOSIS — K21.00 GASTROESOPHAGEAL REFLUX DISEASE WITH ESOPHAGITIS WITHOUT HEMORRHAGE: ICD-10-CM

## 2022-12-06 DIAGNOSIS — G43.109 MIGRAINE WITH AURA AND WITHOUT STATUS MIGRAINOSUS, NOT INTRACTABLE: ICD-10-CM

## 2022-12-06 LAB
EST. AVERAGE GLUCOSE BLD GHB EST-MCNC: 114 MG/DL
HBA1C MFR BLD: 5.6 %

## 2022-12-06 RX ORDER — OMEPRAZOLE 20 MG/1
20 CAPSULE, DELAYED RELEASE ORAL
Qty: 30 CAPSULE | Refills: 2 | Status: SHIPPED | OUTPATIENT
Start: 2022-12-06

## 2022-12-06 RX ORDER — PROPRANOLOL HYDROCHLORIDE 20 MG/1
20 TABLET ORAL EVERY 12 HOURS SCHEDULED
Qty: 60 TABLET | Refills: 2 | Status: SHIPPED | OUTPATIENT
Start: 2022-12-06

## 2023-01-23 ENCOUNTER — EVALUATION (OUTPATIENT)
Dept: PHYSICAL THERAPY | Facility: CLINIC | Age: 43
End: 2023-01-23

## 2023-01-23 DIAGNOSIS — G89.29 CHRONIC LEFT-SIDED LOW BACK PAIN WITH LEFT-SIDED SCIATICA: ICD-10-CM

## 2023-01-23 DIAGNOSIS — Z98.1 S/P LUMBAR SPINAL FUSION: Primary | ICD-10-CM

## 2023-01-23 DIAGNOSIS — M54.42 CHRONIC LEFT-SIDED LOW BACK PAIN WITH LEFT-SIDED SCIATICA: ICD-10-CM

## 2023-01-23 NOTE — LETTER
2023    Ganesh Lomeli PA-C  [address]    Patient: Myrna Petit   YOB: 1980   Date of Visit: 2023     Encounter Diagnosis     ICD-10-CM    1  S/P lumbar spinal fusion  Z98 1       2  Chronic left-sided low back pain with left-sided sciatica  M54 42     G89 29           Dear Dr Susana Buitrago:    Thank you for your recent referral of Myrna Petit  Please review the attached evaluation summary from Tremaine's recent visit  Please verify that you agree with the plan of care by signing the attached order  If you have any questions or concerns, please do not hesitate to call  I sincerely appreciate the opportunity to share in the care of one of your patients and hope to have another opportunity to work with you in the near future  Sincerely,    Hari Longo, PT      Referring Provider:      I certify that I have read the below Plan of Care and certify the need for these services furnished under this plan of treatment while under my care  Ganesh Lomeli PA-C  [address] [address]  Via Fax: [phone]          PT Evaluation     Today's date: 2023  Patient name: Myrna Petit  : 1980  MRN: [id number]  Referring provider: Glenn An PA-C  Dx:   Encounter Diagnosis     ICD-10-CM    1  S/P lumbar spinal fusion  Z98 1       2  Chronic left-sided low back pain with left-sided sciatica  M54 42     G89 29           Start Time: 1400  Stop Time: 1500  Total time in clinic (min): 60 minutes    Assessment  Assessment details: Patient is a 39 yo male s/p lumbar fusion L4-S1 in 2022, following work injury in 2019  Following a thorough physical therapy evaluation, the patient demonstrates impaired gait, impaired LE strength (L>R), and impaired core stability  Hamstrings, hip flexors and gastroc also very limited in overall ROM, likely secondary to decreased activity post-op   Had some pain during SLR c/ PPT this visit, but able to tolerate 5 repetitions  Responded well to hamstring stretch c/ increased mobility  Provided patient c/ handout c/ HEP  Patient demonstrated each exercise c/ good form and verbalized understanding of expectations c/ HEP  Patient will benefit from skilled physical therapy treatment to address the aforementioned impairments and functional deficits, accomplish patient goals and return patient to work  Impairments: abnormal gait, abnormal or restricted ROM, abnormal movement, activity intolerance, impaired balance, impaired physical strength, lacks appropriate home exercise program and pain with function    Goals  STG (3 weeks): Hip extension > neutral   Improve LE strength by 1/2 grade  LTG (8 weeks):  LLE Strength >=4/5   80% improvement in left sided LBP and radicular symptoms during activity  Patient will be independent with HEP in 8 weeks to allow independent management of condition  Plan  Plan details: TE, NMR, TA, MT, self-care, and modalities as needed in order to progress through skilled PT focused on ROM, strength, balance, motor control  Patient would benefit from: skilled physical therapy  Planned modality interventions: cryotherapy and thermotherapy: hydrocollator packs  Planned therapy interventions: manual therapy, neuromuscular re-education, patient education, self care, therapeutic activities, therapeutic exercise and home exercise program  Frequency: 2x week  Duration in weeks: 8        Subjective Evaluation    History of Present Illness  Mechanism of injury: IE: Patient is a 39 yo male presenting s/p L/S fusion 9/23/22  Hurt back in June 2019 at work, [employer], lifting propane tank while carrying up the stairs  Tripped and twisted his back  Has been off since the injury  Patient reports prior to surgery has pain in left low back and radicular symptoms left leg (all the way to foot)  Radicular symptoms weakness, N/T & pain   Since, surgery radicular symptoms have improved, but continues to experience muscle tension in middle of low back ("knot")  Also, reports feeling like he is walking on a golf ball under left foot when barefoot  Patient's primary goal to improve pain and feel better and return to work  Has follow-up with doctor in April/May 2023  Pain  Current pain ratin (low back)  At best pain ratin  At worst pain rating: 10  Relieving factors: medications and relaxation    Social Support    Employment status: not working (Off work 2* work comp injury)  Patient Goals  Patient goals for therapy: decreased pain, increased motion, increased strength and return to sport/leisure activities          Objective    Observation:     -Gait: Ambulates c/ SPC, decreased weightbearing and stance phase on LLE    TTP: Along sciatic n  In posterior thigh    Core Stability:     -Able to maintain PPT c/ hips in 90/90 for 5", when cued  -Difficulty maintaining PPT c/ SLR  Hip ROM (degrees):  ER: WNL b/l  IR: pain on left, slightly less ROM than R  FLX: WNL b/l c/ knees flexed    EXT: limitation beyond neutral b/l    Hip Strength (MMT Grades):  FLX (Supine, R/L): 4 / 3+  ABD (Side-lying, R/L): 4 / 3+    Knee Strength (MMT Grades):  EXT (R/L):  4+ / 3+  FLX (R/L): 4+ / 3+    Ankle Strength (MMT Grades):  DF (R/L): 4+ / 3      Precautions: s/p L/S Fusion L4-S1       Date        Manuals        Manual Traction        STM to erector spinae and QL                        Neuro Re-Ed        Ab Decker        Deadbugs        Supine Clams L2 5"x20       S/Lying Clams        Reverse Clams        TB Rows/Ext        Seated Floss        Bridge: 3"/3"/3" 3x8       SLR c/ PPT 1x5       Bird-dog: UE/LE        [place]        SLS        Rows: Seated on SB        Push-up Progression                Ther Ex        LTRs        SKTC/DKTC        Gastroc Stretch        HS Stretch 30"x4 strap       Hip Flexor Stretch        Leg Press        Hand/Heel Rock        NuStep/Bike Treadmill                        Ther Activity        Sit to Stand        Wall Squat        Lunges        Step-ups                        Modalities                        Self Care: Provided patient c/ handout c/ HEP  Patient demonstrated each exercise c/ good form and verbalized understanding of expectations c/ HEP  Access Code: [email or web address]  URL: [email or web address]  Date: 01/23/2023  Prepared by: Merlin Eng    Exercises  • Supine Bridge - 1 x daily - 7 x weekly - 1-3 sets - 8 reps  • Hooklying Clamshell with Resistance - 1 x daily - 7 x weekly - 2 sets - 6-10 reps - 5 seconds hold  • Supine Pelvic Tilt with Straight Leg Raise - 1 x daily - 7 x weekly - 1-3 sets - 5-10 reps  • Supine Hamstring Stretch with Strap - 1 x daily - 7 x weekly - 4 sets - 30 seconds hold

## 2023-01-23 NOTE — PROGRESS NOTES
PT Evaluation     Today's date: 2023  Patient name: Osmani Mayorga  : 1980  MRN: [id number]  Referring provider: Vilma Diallo PA-C  Dx:   Encounter Diagnosis     ICD-10-CM    1  S/P lumbar spinal fusion  Z98 1       2  Chronic left-sided low back pain with left-sided sciatica  M54 42     G89 29           Start Time: 1400  Stop Time: 1500  Total time in clinic (min): 60 minutes    Assessment  Assessment details: Patient is a 41 yo male s/p lumbar fusion L4-S1 in 2022, following work injury in   Following a thorough physical therapy evaluation, the patient demonstrates impaired gait, impaired LE strength (L>R), and impaired core stability  Hamstrings, hip flexors and gastroc also very limited in overall ROM, likely secondary to decreased activity post-op  Had some pain during SLR c/ PPT this visit, but able to tolerate 5 repetitions  Responded well to hamstring stretch c/ increased mobility  Provided patient c/ handout c/ HEP  Patient demonstrated each exercise c/ good form and verbalized understanding of expectations c/ HEP  Patient will benefit from skilled physical therapy treatment to address the aforementioned impairments and functional deficits, accomplish patient goals and return patient to work  Impairments: abnormal gait, abnormal or restricted ROM, abnormal movement, activity intolerance, impaired balance, impaired physical strength, lacks appropriate home exercise program and pain with function    Goals  STG (3 weeks): Hip extension > neutral   Improve LE strength by 1/2 grade  LTG (8 weeks):  LLE Strength >=4/5   80% improvement in left sided LBP and radicular symptoms during activity  Patient will be independent with HEP in 8 weeks to allow independent management of condition  Plan  Plan details: TE, NMR, TA, MT, self-care, and modalities as needed in order to progress through skilled PT focused on ROM, strength, balance, motor control     Patient would benefit from: skilled physical therapy  Planned modality interventions: cryotherapy and thermotherapy: hydrocollator packs  Planned therapy interventions: manual therapy, neuromuscular re-education, patient education, self care, therapeutic activities, therapeutic exercise and home exercise program  Frequency: 2x week  Duration in weeks: 8        Subjective Evaluation    History of Present Illness  Mechanism of injury: IE: Patient is a 41 yo male presenting s/p L/S fusion 22  Hurt back in 2019 at work, [employer], lifting propane tank while carrying up the stairs  Tripped and twisted his back  Has been off since the injury  Patient reports prior to surgery has pain in left low back and radicular symptoms left leg (all the way to foot)  Radicular symptoms weakness, N/T & pain  Since, surgery radicular symptoms have improved, but continues to experience muscle tension in middle of low back ("knot")  Also, reports feeling like he is walking on a golf ball under left foot when barefoot  Patient's primary goal to improve pain and feel better and return to work  Has follow-up with doctor in April/May 2023  Pain  Current pain ratin (low back)  At best pain ratin  At worst pain rating: 10  Relieving factors: medications and relaxation    Social Support    Employment status: not working (Off work 2* work comp injury)  Patient Goals  Patient goals for therapy: decreased pain, increased motion, increased strength and return to sport/leisure activities          Objective    Observation:     -Gait: Ambulates c/ SPC, decreased weightbearing and stance phase on LLE    TTP: Along sciatic n  In posterior thigh    Core Stability:     -Able to maintain PPT c/ hips in 90/90 for 5", when cued  -Difficulty maintaining PPT c/ SLR  Hip ROM (degrees):  ER: WNL b/l  IR: pain on left, slightly less ROM than R  FLX: WNL b/l c/ knees flexed    EXT: limitation beyond neutral b/l    Hip Strength (MMT Grades):  FLX (Supine, R/L): 4 / 3+  ABD (Side-lying, R/L): 4 / 3+    Knee Strength (MMT Grades):  EXT (R/L):  4+ / 3+  FLX (R/L): 4+ / 3+    Ankle Strength (MMT Grades):  DF (R/L): 4+ / 3       Precautions: s/p L/S Fusion L4-S1       Date 1/23       Manuals        Manual Traction        STM to erector spinae and QL                        Neuro Re-Ed        Ab Marchgerardo        Deadbugs        Supine Clams L2 5"x20       S/Lying Clams        Reverse Clams        TB Rows/Ext        Seated Floss        Bridge: 3"/3"/3" 3x8       SLR c/ PPT 1x5       Bird-dog: UE/LE        [place]        SLS        Rows: Seated on SB        Push-up Progression                Ther Ex        LTRs        SKTC/DKTC        Gastroc Stretch        HS Stretch 30"x4 strap       Hip Flexor Stretch        Leg Press        Hand/Heel Rock        NuStep/Bike        Treadmill                        Ther Activity        Sit to Stand        Wall Squat        Lunges        Step-ups                        Modalities                        Self Care: Provided patient c/ handout c/ HEP  Patient demonstrated each exercise c/ good form and verbalized understanding of expectations c/ HEP  Access Code: [email or web address]  URL: [email or web address]  Date: 01/23/2023  Prepared by: Yary Aguilar    Exercises  • Supine Bridge - 1 x daily - 7 x weekly - 1-3 sets - 8 reps  • Hooklying Clamshell with Resistance - 1 x daily - 7 x weekly - 2 sets - 6-10 reps - 5 seconds hold  • Supine Pelvic Tilt with Straight Leg Raise - 1 x daily - 7 x weekly - 1-3 sets - 5-10 reps  • Supine Hamstring Stretch with Strap - 1 x daily - 7 x weekly - 4 sets - 30 seconds hold

## 2023-01-27 ENCOUNTER — OFFICE VISIT (OUTPATIENT)
Dept: PHYSICAL THERAPY | Facility: CLINIC | Age: 43
End: 2023-01-27

## 2023-01-27 DIAGNOSIS — G89.29 CHRONIC LEFT-SIDED LOW BACK PAIN WITH LEFT-SIDED SCIATICA: ICD-10-CM

## 2023-01-27 DIAGNOSIS — Z98.1 S/P LUMBAR SPINAL FUSION: Primary | ICD-10-CM

## 2023-01-27 DIAGNOSIS — M54.42 CHRONIC LEFT-SIDED LOW BACK PAIN WITH LEFT-SIDED SCIATICA: ICD-10-CM

## 2023-01-27 NOTE — PROGRESS NOTES
Daily Note     Today's date: 2023  Patient name: Sly Brito  : 1980  MRN: [id number]  Referring provider: Martha Negron PA-C  Dx:   Encounter Diagnosis     ICD-10-CM    1  S/P lumbar spinal fusion  Z98 1       2  Chronic left-sided low back pain with left-sided sciatica  M54 42     G89 29                      Subjective: Patient reports stiffness and pain in his lower back  This is constant  Objective: See treatment diary below  Assessment: Tolerated treatment fair  Patient would benefit from continued PT to improve mobility, strength, and functionality  Patient able to tolerate program today  Plan to incorporated standing Rows/ext and anti rotation press next session  Patient L side more tightness in HS, 45*  Plan: Continue per plan of care  Precautions: s/p L/S Fusion L4-S1       Date       Manuals        Manual Traction        STM to erector spinae and QL        PROM B LE HS/Calves/groin  CM              Neuro Re-Ed        TA brace  5"x15      TA brace + Marches  2x10 ea      Deadbugs        Supine Clams L2 5"x20 L2 5"x20      S/Lying Clams        Reverse Clams        TB Rows/Ext  NV      Seated Floss        Bridge: 3"/3"/3" 3x8 3x8      SLR c/ PPT 1x5 Held      Bird-dog: UE/LE        [place]        SLS        Rows: Seated on SB        Push-up Progression        Side stepping        Anti rotations  NV              Ther Ex        LTRs  NV      SKTC/DKTC  NV      Gastroc Stretch        HS Stretch 30"x4 strap       Hip Flexor Stretch        Leg Press        Hand/Heel Rock        NuStep/Bike  NS L3 10' LE ROM/ endurance      Treadmill                        Ther Activity        Sit to Stand        Wall Squat        Lunges        Step-ups                        Modalities                        Self Care: Provided patient c/ handout c/ HEP  Patient demonstrated each exercise c/ good form and verbalized understanding of expectations c/ HEP       Access Code: [email or web address]  URL: [email or web address]  Date: 01/23/2023  Prepared by: Neida Ferrara    Exercises  • Supine Bridge - 1 x daily - 7 x weekly - 1-3 sets - 8 reps  • Hooklying Clamshell with Resistance - 1 x daily - 7 x weekly - 2 sets - 6-10 reps - 5 seconds hold  • Supine Pelvic Tilt with Straight Leg Raise - 1 x daily - 7 x weekly - 1-3 sets - 5-10 reps  • Supine Hamstring Stretch with Strap - 1 x daily - 7 x weekly - 4 sets - 30 seconds hold

## 2023-01-30 ENCOUNTER — OFFICE VISIT (OUTPATIENT)
Dept: PHYSICAL THERAPY | Facility: CLINIC | Age: 43
End: 2023-01-30

## 2023-01-30 DIAGNOSIS — M54.42 CHRONIC LEFT-SIDED LOW BACK PAIN WITH LEFT-SIDED SCIATICA: ICD-10-CM

## 2023-01-30 DIAGNOSIS — G89.29 CHRONIC LEFT-SIDED LOW BACK PAIN WITH LEFT-SIDED SCIATICA: ICD-10-CM

## 2023-01-30 DIAGNOSIS — Z98.1 S/P LUMBAR SPINAL FUSION: Primary | ICD-10-CM

## 2023-01-30 NOTE — PROGRESS NOTES
Daily Note     Today's date: 2023  Patient name: Susannah Shultz  : 1980  MRN: [id number]  Referring provider: Enmanuel Richter PA-C  Dx:   Encounter Diagnosis     ICD-10-CM    1  S/P lumbar spinal fusion  Z98 1       2  Chronic left-sided low back pain with left-sided sciatica  M54 42     G89 29           Start Time: 1500  Stop Time: 1600  Total time in clinic (min): 60 minutes    Subjective: Reports he was sore following last visit primarily in his low back  Objective: See treatment diary below      Assessment: Tolerated treatment well  Patient demonstrated fatigue post treatment, exhibited good technique with therapeutic exercises and would benefit from continued PT  Experiences some LBP during bridges, so prior to this exercise, completed TA activation to ensure proper core activation during bridges  Minimal change in pain c/ TA cue  Incorporated anti-rotation press this visit, patient felt no core activation c/ TB, so progressed exercise to cable column, which appropriately challenged patient  Plan: Continue per plan of care        Precautions: s/p L/S Fusion L4-S1       Date      Manuals        Manual Traction        STM to erector spinae and QL        PROM B LE HS/Calves/groin  CM KS             Neuro Re-Ed        TA brace  5"x15 5"x15     TA brace + Marches  2x10 ea      Deadbugs        Supine Clams L2 5"x20 L2 5"x20 L2 Bent Knee Fallout 5"x20ea     S/Lying Clams        Reverse Clams        [place]  NV L5 3x10     Seated Floss        Bridge: 3"/3"/3" 3x8 3x8 3x8     SLR c/ PPT 1x5 Held      Bird-dog: UE/LE        [place]   TB L5 3x10     SLS        Rows: Seated on SB        Push-up Progression        Side stepping        Anti rotations  NV P1 3x10ea             Ther Ex        LTRs  NV x20ea     SKTC/DKTC  NV      Gastroc Stretch        HS Stretch 30"x4 strap       Hip Flexor Stretch        Leg Press        Hand/Heel Rock        NuStep/Bike  NS L3 10' LE ROM/ endurance NS L6 10' LE ROM/ endurance     Treadmill                        Ther Activity        Sit to Stand        Wall Squat        Lunges        Step-ups                        Modalities                            Access Code: [email or web address]  URL: [email or web address]  Date: 01/23/2023  Prepared by: Debbi Acosta    Exercises  • Supine Bridge - 1 x daily - 7 x weekly - 1-3 sets - 8 reps  • Hooklying Clamshell with Resistance - 1 x daily - 7 x weekly - 2 sets - 6-10 reps - 5 seconds hold  • Supine Pelvic Tilt with Straight Leg Raise - 1 x daily - 7 x weekly - 1-3 sets - 5-10 reps  • Supine Hamstring Stretch with Strap - 1 x daily - 7 x weekly - 4 sets - 30 seconds hold

## 2023-02-03 ENCOUNTER — OFFICE VISIT (OUTPATIENT)
Dept: PHYSICAL THERAPY | Facility: CLINIC | Age: 43
End: 2023-02-03

## 2023-02-03 DIAGNOSIS — Z98.1 S/P LUMBAR SPINAL FUSION: Primary | ICD-10-CM

## 2023-02-03 DIAGNOSIS — G89.29 CHRONIC LEFT-SIDED LOW BACK PAIN WITH LEFT-SIDED SCIATICA: ICD-10-CM

## 2023-02-03 DIAGNOSIS — M54.42 CHRONIC LEFT-SIDED LOW BACK PAIN WITH LEFT-SIDED SCIATICA: ICD-10-CM

## 2023-02-03 NOTE — PROGRESS NOTES
Daily Note     Today's date: 2/3/2023  Patient name: Montez Sutherland  : 1980  MRN: [id number]  Referring provider: Codie Ruano PA-C  Dx:   Encounter Diagnosis     ICD-10-CM    1  S/P lumbar spinal fusion  Z98 1       2  Chronic left-sided low back pain with left-sided sciatica  M54 42     G89 29                      Subjective: Patient reports that he always has soreness and there is no changes as of now  He does try to move more  He has R side m knots in lower back  Objective: See treatment diary below  Incorporated rows and increased TB resistance for bent knee fall out  Assessment: Tolerated treatment well  Patient would benefit from continued PT to improve mobility and strength for functionality  He does not experience an increase of pain, normal discomfort is there  Stiffness in lower back, and L HS mobility more limited  Plan: Continue per plan of care        Precautions: s/p L/S Fusion L4-S1       Date  2/3    Manuals        Manual Traction        STM to erector spinae and QL        PROM B LE HS/Calves/groin  CM KS CM            Neuro Re-Ed        TA brace  5"x15 5"x15     TA brace + Marches  2x10 ea      Deadbugs        Supine Clams L2 5"x20 L2 5"x20 L2 Bent Knee Fallout 5"x20ea L3 Bent Knee Fallout 5"x20ea    S/Lying Clams        Reverse Clams        [place]  NV L5 3x10 L5 3x10    Rows    TB L5 3x10            Bridge: 3"/3"/3" 3x8 3x8 3x8 3x10    SLR c/ PPT 1x5 Held      Bird-dog: UE/LE        SLS        Rows: Seated on SB        Push-up Progression        Side stepping        Anti rotations  NV P1 3x10ea P1 2x10 ea            Ther Ex        LTRs  NV x20ea 20x     SKTC/DKTC  NV      Gastroc Stretch        HS Stretch 30"x4 strap       Hip Flexor Stretch        Leg Press        Hand/Heel Rock        NuStep/Bike  NS L3 10' LE ROM/ endurance NS L6 10' LE ROM/ endurance NS L6 10' LE ROM/ endurance    Treadmill                        Ther Activity        Sit to Stand Wall Squat        Lunges        Step-ups                        Modalities

## 2023-02-06 ENCOUNTER — OFFICE VISIT (OUTPATIENT)
Dept: PHYSICAL THERAPY | Facility: CLINIC | Age: 43
End: 2023-02-06

## 2023-02-06 DIAGNOSIS — Z98.1 S/P LUMBAR SPINAL FUSION: Primary | ICD-10-CM

## 2023-02-06 DIAGNOSIS — G89.29 CHRONIC LEFT-SIDED LOW BACK PAIN WITH LEFT-SIDED SCIATICA: ICD-10-CM

## 2023-02-06 DIAGNOSIS — M54.42 CHRONIC LEFT-SIDED LOW BACK PAIN WITH LEFT-SIDED SCIATICA: ICD-10-CM

## 2023-02-06 NOTE — PROGRESS NOTES
Daily Note     Today's date: 2023  Patient name: Cece Fernando  : 1980  MRN: [id number]  Referring provider: Ceci Baum PA-C  Dx:   Encounter Diagnosis     ICD-10-CM    1  S/P lumbar spinal fusion  Z98 1       2  Chronic left-sided low back pain with left-sided sciatica  M54 42     G89 29                      Subjective: The patient states that he is doing okay today, offers no new complaints at start of session  Objective: See treatment diary below      Assessment: Tolerated treatment well  Added sit to stands today, able to complete 10x seated on airex  Increased reliance on RLE with sit to stand and leans to right with transfers  Cue to focus on increased use of LLE when going sit to stand  Patient demonstrated fatigue post treatment, exhibited good technique with therapeutic exercises and would benefit from continued PT      Plan: Continue per plan of care        Precautions: s/p L/S Fusion L4-S1       Date  2/3 2/6   Manuals        Manual Traction        STM to erector spinae and QL        PROM B LE HS/Calves/groin  CM KS CM ML           Neuro Re-Ed        TA brace  5"x15 5"x15     TA brace + Marches  2x10 ea      Deadbugs        Supine Clams L2 5"x20 L2 5"x20 L2 Bent Knee Fallout 5"x20ea L3 Bent Knee Fallout 5"x20ea L3 Bent knee fallout 5"x20 ea   S/Lying Clams        Reverse Clams        [place]  NV L5 3x10 L5 3x10 L5 3x10   Rows    TB L5 3x10 TB L5 3x10           Bridge: 3"/3"/3" 3x8 3x8 3x8 3x10 3x10   SLR c/ PPT 1x5 Held      Bird-dog: UE/LE        SLS        Rows: Seated on SB        Push-up Progression        Side stepping        Anti rotations  NV P1 3x10ea P1 2x10 ea P1 2x10 ea           Ther Ex        LTRs  NV x20ea 20x  20x   SKTC/DKTC  NV      Gastroc Stretch        HS Stretch 30"x4 strap       Hip Flexor Stretch        Leg Press        Hand/Heel Rock        NuStep/Bike  NS L3 10' LE ROM/ endurance NS L6 10' LE ROM/ endurance NS L6 10' LE ROM/ endurance NS L6  10' LE ROM/endurance   Treadmill                        Ther Activity        Sit to Stand     W/Airex  10x   Wall Squat        Lunges        Step-ups                        Modalities

## 2023-02-10 ENCOUNTER — OFFICE VISIT (OUTPATIENT)
Dept: PHYSICAL THERAPY | Facility: CLINIC | Age: 43
End: 2023-02-10

## 2023-02-10 DIAGNOSIS — M54.42 CHRONIC LEFT-SIDED LOW BACK PAIN WITH LEFT-SIDED SCIATICA: Primary | ICD-10-CM

## 2023-02-10 DIAGNOSIS — G89.29 CHRONIC LEFT-SIDED LOW BACK PAIN WITH LEFT-SIDED SCIATICA: Primary | ICD-10-CM

## 2023-02-10 DIAGNOSIS — Z98.1 S/P LUMBAR SPINAL FUSION: ICD-10-CM

## 2023-02-10 NOTE — PROGRESS NOTES
Daily Note     Today's date: 2/10/2023  Patient name: Montez Sutherland  : 1980  MRN: [id number]  Referring provider: Codie Ruano PA-C  Dx:   Encounter Diagnosis     ICD-10-CM    1  Chronic left-sided low back pain with left-sided sciatica  M54 42     G89 29       2  S/P lumbar spinal fusion  Z98 1                      Subjective: Patient reports that he is sore every day and this is normal for him  Objective: See treatment diary below  Assessment: Tolerated treatment well  Patient exhibited good technique with therapeutic exercises and would benefit from continued PT to improve overall strength and mobility  Patient has more fluent ambulation today, but still has his normal pain  Plan: Continue per plan of care        Precautions: s/p L/S Fusion L4-S1       Date 2/10 1/27 1/30 2/3 26   Manuals        Manual Traction        STM to erector spinae and QL        PROM B LE HS/Calves/groin CM CM KS CM ML           Neuro Re-Ed        TA brace  5"x15 5"x15     TA brace + Marches  2x10 ea      Deadbugs        Supine Clams L3 Bent knee fallout 5"x20 ea L2 5"x20 L2 Bent Knee Fallout 5"x20ea L3 Bent Knee Fallout 5"x20ea L3 Bent knee fallout 5"x20 ea   S/Lying Clams        Reverse Clams        [place] L5 3x10 NV L5 3x10 L5 3x10 L5 3x10   Rows TB L5 3x10   TB L5 3x10 TB L5 3x10           Bridge: 3"/3"/3" 3x10 3x8 3x8 3x10 3x10   SLR c/ PPT  Held      Bird-dog: UE/LE        SLS        Rows: Seated on SB        Push-up Progression        Side stepping        Anti rotations P1 2x10 ea NV P1 3x10ea P1 2x10 ea P1 2x10 ea           Ther Ex        LTRs 20x  NV x20ea 20x  20x   SKTC/DKTC  NV      Gastroc Stretch        HS Stretch        Hip Flexor Stretch        Leg Press        Hand/Heel Rock        NuStep/Bike NS L6  10' LE ROM/endurance NS L3 10' LE ROM/ endurance NS L6 10' LE ROM/ endurance NS L6 10' LE ROM/ endurance NS L6  10' LE ROM/endurance   Treadmill                        Ther Activity        Sit to Stand W airex 10x     W/Airex  10x   Wall Squat        Lunges        Step-ups                        Modalities

## 2023-02-13 ENCOUNTER — APPOINTMENT (OUTPATIENT)
Dept: PHYSICAL THERAPY | Facility: CLINIC | Age: 43
End: 2023-02-13

## 2023-02-17 ENCOUNTER — EVALUATION (OUTPATIENT)
Dept: PHYSICAL THERAPY | Facility: CLINIC | Age: 43
End: 2023-02-17

## 2023-02-17 DIAGNOSIS — G89.29 CHRONIC LEFT-SIDED LOW BACK PAIN WITH LEFT-SIDED SCIATICA: Primary | ICD-10-CM

## 2023-02-17 DIAGNOSIS — Z98.1 S/P LUMBAR SPINAL FUSION: ICD-10-CM

## 2023-02-17 DIAGNOSIS — M54.42 CHRONIC LEFT-SIDED LOW BACK PAIN WITH LEFT-SIDED SCIATICA: Primary | ICD-10-CM

## 2023-02-17 NOTE — PROGRESS NOTES
PT Discharge    Today's date: 2023  Patient name: More Glover  : 1980  MRN: [id number]  Referring provider: Ricki Garcia PA-C  Dx:   Encounter Diagnosis     ICD-10-CM    1  Chronic left-sided low back pain with left-sided sciatica  M54 42     G89 29       2  S/P lumbar spinal fusion  Z98 1           Start Time: 1100  Stop Time: 1200  Total time in clinic (min): 60 minutes    Assessment  Assessment details: Patient is a 39 yo male s/p lumbar fusion L4-S1 in 2022, following work injury in   Patient demonstrates increased LLE strength since beginning PT, but otherwise c/ minimal subjective changes  He demonstrates independence c/ HEP exercises and is ready to transition to independent management c/ HEP to continue to progress towards LTGs  He is discharged from PT at this time, will contact us for further care or questions as indicated  Impairments: abnormal gait, abnormal or restricted ROM, abnormal movement, activity intolerance, impaired balance, impaired physical strength, lacks appropriate home exercise program and pain with function    Goals  STG (3 weeks): Hip extension > neutral  Progressing  Improve LE strength by 1/2 grade  Met    LTG (8 weeks): Progressing  LLE Strength >=4/5    80% improvement in left sided LBP and radicular symptoms during activity  Patient will be independent with HEP in 8 weeks to allow independent management of condition  Plan  Plan details: D/c patient to independent management c/ HEP  Planned therapy interventions: home exercise program  Treatment plan discussed with: patient        Subjective Evaluation    History of Present Illness  Mechanism of injury: IE: Patient is a 39 yo male presenting s/p L/S fusion 22  Hurt back in 2019 at work, [employer], lifting propane tank while carrying up the stairs  Tripped and twisted his back  Has been off since the injury   Patient reports prior to surgery has pain in left low back and Spoke with patient - come in today for labs and to start stool cultures for diarrhea.  Nausea - will call in zofran.  Anxiety persistent and intense.  She was able to sleep well with zoloft - took 50 mg at 10pm - she slept until 6:30 am.  She was not sleeping before and did not sleep last night - did not take zoloft.  We discussed trying 25 mg of zoloft.  Trial of zofran 8 mg twice daily prn for nausea.  Call if GI symptoms worsening/persistent.  Can give work excuse if needed.   radicular symptoms left leg (all the way to foot)  Radicular symptoms weakness, N/T & pain  Since, surgery radicular symptoms have improved, but continues to experience muscle tension in middle of low back ("knot")  Also, reports feeling like he is walking on a golf ball under left foot when barefoot  Patient's primary goal to improve pain and feel better and return to work  Has follow-up with doctor in April/May 2023  UPDATE : Reports minimal changes c/ PT  Still continues to have more pain c/ increasing activity  Has been completing HEP exercises at home at least 1x/wk  Pain  Current pain ratin (low back)  At best pain ratin  At worst pain rating: 10  Relieving factors: medications and relaxation    Social Support    Employment status: not working (Off work 2* work comp injury)  Patient Goals  Patient goals for therapy: decreased pain, increased motion, increased strength and return to sport/leisure activities          Objective    Observation:     -Gait: Ambulates c/ SPC, decreased weightbearing and stance phase on LLE    TTP: Along sciatic n  In posterior thigh    Core Stability:     -Able to maintain PPT c/ hips in 90/90 for 5", when cued  -Difficulty maintaining PPT c/ SLR  Hip ROM (degrees):  ER: WNL b/l  IR: pain on left, slightly less ROM than R  FLX: WNL b/l c/ knees flexed    EXT: limitation beyond neutral b/l    Hip Strength (MMT Grades):  FLX (Supine, R/L): 4 / 4-  ABD (Side-lying, R/L): 4 / 4-    Knee Strength (MMT Grades):  EXT (R/L):  4+ / 4-  FLX (R/L): 4+ / 4-    Ankle Strength (MMT Grades):  DF (R/L): 4+ / 3+       Precautions: s/p L/S Fusion L4-S1       Date 2/10 2/17 1/30 2/3 2/6   Manuals        Manual Traction        STM to erector spinae and QL        PROM B LE HS/Calves/groin CM KS KS CM ML           Neuro Re-Ed        TA brace   5"x15     TA brace + Marches        Deadbugs        Supine Clams L3 Bent knee fallout 5"x20 ea L3 Bent knee fallout 5"x20 ea L2 Bent Knee Fallout 5"x20ea L3 Bent Knee Fallout 5"x20ea L3 Bent knee fallout 5"x20 ea   S/Lying Clams        Reverse Clams        CHINA L5 3x10 L5 3x10 L5 3x10 L5 3x10 L5 3x10   Rows TB L5 3x10 TB L5 3x10  TB L5 3x10 TB L5 3x10           Bridge: 3"/3"/3" 3x10 3x10 3x8 3x10 3x10   SLR c/ PPT        Bird-dog: UE/LE        SLS        Rows: Seated on SB        Push-up Progression        Side stepping        Anti rotations P1 2x10 ea P1 2x10ea P1 3x10ea P1 2x10 ea P1 2x10 ea           Ther Ex        LTRs 20x  20x x20ea 20x  20x   SKTC/DKTC        Gastroc Stretch        HS Stretch        Hip Flexor Stretch        Leg Press        Hand/Heel Rock        NuStep/Bike NS L6  10' LE ROM/endurance NS L6  10' LE ROM/Endurance NS L6 10' LE ROM/ endurance NS L6 10' LE ROM/ endurance NS L6  10' LE ROM/endurance   Treadmill                        Ther Activity        Sit to Stand W airex 10x  C/ Airex 10x   W/Airex  10x   Wall Squat        Lunges        Step-ups                        Modalities                            Access Code: [email or web address]  URL: [email or web address]  Date: 01/23/2023  Prepared by: Marilyn Schumacher    Exercises  • Supine Bridge - 1 x daily - 7 x weekly - 1-3 sets - 8 reps  • Hooklying Clamshell with Resistance - 1 x daily - 7 x weekly - 2 sets - 6-10 reps - 5 seconds hold  • Supine Pelvic Tilt with Straight Leg Raise - 1 x daily - 7 x weekly - 1-3 sets - 5-10 reps  • Supine Hamstring Stretch with Strap - 1 x daily - 7 x weekly - 4 sets - 30 seconds hold

## 2023-02-20 ENCOUNTER — APPOINTMENT (OUTPATIENT)
Dept: PHYSICAL THERAPY | Facility: CLINIC | Age: 43
End: 2023-02-20

## 2023-02-20 DIAGNOSIS — E78.5 DYSLIPIDEMIA: ICD-10-CM

## 2023-02-20 RX ORDER — ATORVASTATIN CALCIUM 20 MG/1
20 TABLET, FILM COATED ORAL DAILY
Qty: 30 TABLET | Refills: 5 | Status: SHIPPED | OUTPATIENT
Start: 2023-02-20

## 2023-02-24 ENCOUNTER — APPOINTMENT (OUTPATIENT)
Dept: PHYSICAL THERAPY | Facility: CLINIC | Age: 43
End: 2023-02-24

## 2023-03-10 DIAGNOSIS — G43.109 MIGRAINE WITH AURA AND WITHOUT STATUS MIGRAINOSUS, NOT INTRACTABLE: ICD-10-CM

## 2023-03-10 RX ORDER — PROPRANOLOL HYDROCHLORIDE 20 MG/1
20 TABLET ORAL EVERY 12 HOURS SCHEDULED
Qty: 60 TABLET | Refills: 2 | Status: SHIPPED | OUTPATIENT
Start: 2023-03-10

## 2023-03-23 DIAGNOSIS — K21.00 GASTROESOPHAGEAL REFLUX DISEASE WITH ESOPHAGITIS WITHOUT HEMORRHAGE: ICD-10-CM

## 2023-03-23 DIAGNOSIS — K44.9 HIATAL HERNIA: ICD-10-CM

## 2023-03-23 RX ORDER — OMEPRAZOLE 20 MG/1
20 CAPSULE, DELAYED RELEASE ORAL
Qty: 30 CAPSULE | Refills: 2 | Status: SHIPPED | OUTPATIENT
Start: 2023-03-23

## 2023-05-11 ENCOUNTER — OFFICE VISIT (OUTPATIENT)
Dept: FAMILY MEDICINE CLINIC | Facility: CLINIC | Age: 43
End: 2023-05-11

## 2023-05-11 VITALS
HEART RATE: 70 BPM | BODY MASS INDEX: 31.06 KG/M2 | SYSTOLIC BLOOD PRESSURE: 130 MMHG | DIASTOLIC BLOOD PRESSURE: 74 MMHG | HEIGHT: 65 IN | WEIGHT: 186.4 LBS | OXYGEN SATURATION: 97 %

## 2023-05-11 DIAGNOSIS — M54.16 LUMBAR RADICULOPATHY: ICD-10-CM

## 2023-05-11 DIAGNOSIS — G43.109 MIGRAINE WITH AURA AND WITHOUT STATUS MIGRAINOSUS, NOT INTRACTABLE: ICD-10-CM

## 2023-05-11 DIAGNOSIS — R73.01 ELEVATED FASTING GLUCOSE: ICD-10-CM

## 2023-05-11 DIAGNOSIS — M51.16 LUMBAR DISC HERNIATION WITH RADICULOPATHY: ICD-10-CM

## 2023-05-11 DIAGNOSIS — M51.36 DISC DEGENERATION, LUMBAR: ICD-10-CM

## 2023-05-11 DIAGNOSIS — Z98.890 S/P LUMBAR DISCECTOMY: ICD-10-CM

## 2023-05-11 DIAGNOSIS — K58.9 IRRITABLE BOWEL SYNDROME, UNSPECIFIED TYPE: ICD-10-CM

## 2023-05-11 DIAGNOSIS — E78.5 DYSLIPIDEMIA: ICD-10-CM

## 2023-05-11 DIAGNOSIS — Z72.0 TOBACCO ABUSE: ICD-10-CM

## 2023-05-11 DIAGNOSIS — K21.00 GASTROESOPHAGEAL REFLUX DISEASE WITH ESOPHAGITIS WITHOUT HEMORRHAGE: Primary | ICD-10-CM

## 2023-05-11 RX ORDER — ATORVASTATIN CALCIUM 20 MG/1
20 TABLET, FILM COATED ORAL DAILY
Qty: 30 TABLET | Refills: 5 | Status: SHIPPED | OUTPATIENT
Start: 2023-05-11

## 2023-05-11 RX ORDER — PROPRANOLOL HYDROCHLORIDE 40 MG/1
40 TABLET ORAL EVERY 12 HOURS SCHEDULED
Qty: 60 TABLET | Refills: 2 | Status: SHIPPED | OUTPATIENT
Start: 2023-05-11

## 2023-05-11 RX ORDER — DICYCLOMINE HYDROCHLORIDE 10 MG/1
10 CAPSULE ORAL 3 TIMES DAILY PRN
Qty: 30 CAPSULE | Refills: 0 | Status: SHIPPED | OUTPATIENT
Start: 2023-05-11

## 2023-05-11 NOTE — PROGRESS NOTES
Name: Jonelle Park      : 1980      MRN: [id number]  Encounter Provider: Oleg Phillips PA-C  Encounter Date: 2023   Encounter department: [address] [place] [address]     1  Gastroesophageal reflux disease with esophagitis without hemorrhage  Assessment & Plan:  Stable  Continue omeprazole    Orders:  -     Comprehensive metabolic panel; Future  -     CBC and differential; Future    2  Dyslipidemia  Assessment & Plan:  Stable on Lipitor  Labs ordered to assess    Orders:  -     Lipid panel; Future  -     Comprehensive metabolic panel; Future  -     atorvastatin (LIPITOR) 20 mg tablet; Take 1 tablet (20 mg total) by mouth daily    3  Lumbar radiculopathy  Assessment & Plan:  Stable  Continue to follow with neurosurgery  4  Lumbar disc herniation with radiculopathy  Assessment & Plan:  Stable  Continue to follow with neurosurgery  5  Disc degeneration, lumbar  Assessment & Plan:  Stable  Continue to follow with neurosurgery  6  S/P lumbar discectomy    7  Tobacco abuse  Assessment & Plan:  Encouraged patient to quit  He is not ready to quit at this time  Explained that there are a variety of things that we can give him to help with smoking cessation  He will let us know when he is ready  8  Migraine with aura and without status migrainosus, not intractable  Assessment & Plan:  Increased dose from 20 mg BID to 40 mg BID    Orders:  -     propranolol (INDERAL) 40 mg tablet; Take 1 tablet (40 mg total) by mouth every 12 (twelve) hours    9  Elevated fasting glucose  -     Comprehensive metabolic panel; Future  -     HEMOGLOBIN A1C W/ EAG ESTIMATION; Future    10  Irritable bowel syndrome, unspecified type  Assessment & Plan:  Script for bentyl to be used as needed  Suggested trying Metamucil or fiber supplements to see if this improves stools  He had an EGD and colonoscopy in 2019 for same issue  It was not recommended to have repeat colonoscopy until age 48  Orders:  -     dicyclomine (BENTYL) 10 mg capsule; Take 1 capsule (10 mg total) by mouth 3 (three) times a day as needed (stomach cramping)           Subjective      Chucky Alegria is a 40year old male who is here today for a 6 month follow-up  He is doing okay  He is following with neurosurgery  He had a lumbar fusion in September  He still has pain and issues with mobility  He is scheduled to see them for a follow-up next month  He is stable on the omeprazole  He denies any acid reflux  He is complaining of intermittent LLQ pain  His stools are usually soft  He admits that he does not have a good diet  He was seen by GI in 2019 for the same issue  He had a colonoscopy that showed a few polyps and diverticulosis  He admits that the pain is sometimes resolved by a bowel movement  He is unable to pinpoint any trigger foods  He denies any black stools, bloody stools, or unintentional weight loss  He is also having more headaches  He got a lot of relief when he was started on propranolol  He recently started getting about 4-5 headaches per month  Previously, he would get 2-3 per week  He was only getting 1-2 per month when he first started the propranolol  Review of Systems   Constitutional: Negative for chills, diaphoresis, fatigue and fever  HENT: Negative for congestion, ear pain, postnasal drip, rhinorrhea, sneezing, sore throat and trouble swallowing  Eyes: Negative for pain and visual disturbance  Respiratory: Negative for apnea, cough, shortness of breath and wheezing  Cardiovascular: Negative for chest pain and palpitations  Gastrointestinal: Positive for abdominal pain  Negative for constipation, diarrhea, nausea and vomiting  Genitourinary: Negative for dysuria and hematuria  Musculoskeletal: Positive for arthralgias and back pain  Negative for gait problem and myalgias  Neurological: Positive for headaches  Negative for dizziness, syncope, weakness, light-headedness and numbness  "  Psychiatric/Behavioral: Negative for suicidal ideas  The patient is not nervous/anxious  Current Outpatient Medications on File Prior to Visit   Medication Sig   • omeprazole (PriLOSEC) 20 mg delayed release capsule Take 1 capsule (20 mg total) by mouth daily before breakfast   • [DISCONTINUED] atorvastatin (LIPITOR) 20 mg tablet Take 1 tablet (20 mg total) by mouth daily   • [DISCONTINUED] propranolol (INDERAL) 20 mg tablet Take 1 tablet (20 mg total) by mouth every 12 (twelve) hours       Objective     /74   Pulse 70   Ht 5' 5\" (1 651 m)   Wt 84 6 kg (186 lb 6 4 oz)   SpO2 97%   BMI 31 02 kg/m²     Physical Exam  Vitals and nursing note reviewed  Constitutional:       Appearance: He is well-developed  HENT:      Head: Normocephalic and atraumatic  Right Ear: Tympanic membrane, ear canal and external ear normal       Left Ear: Tympanic membrane, ear canal and external ear normal       Nose: Nose normal       Mouth/Throat:      Pharynx: No oropharyngeal exudate or posterior oropharyngeal erythema  Eyes:      Pupils: Pupils are equal, round, and reactive to light  Cardiovascular:      Rate and Rhythm: Normal rate and regular rhythm  Heart sounds: Normal heart sounds  No murmur heard  No friction rub  No gallop  Pulmonary:      Effort: Pulmonary effort is normal  No respiratory distress  Breath sounds: Normal breath sounds  No wheezing or rales  Abdominal:      General: Bowel sounds are normal       Palpations: Abdomen is soft  Tenderness: There is no abdominal tenderness  There is no guarding or rebound  Musculoskeletal:      Cervical back: Normal range of motion and neck supple  Lumbar back: Tenderness present  Decreased range of motion  Lymphadenopathy:      Cervical: No cervical adenopathy  Skin:     General: Skin is warm and dry  Neurological:      Mental Status: He is alert and oriented to person, place, and time        Gait: Gait abnormal " (ambulates with cane)  Psychiatric:         Behavior: Behavior normal          Thought Content:  Thought content normal          Judgment: Judgment normal        Marzena Khan PA-C

## 2023-05-11 NOTE — ASSESSMENT & PLAN NOTE
Encouraged patient to quit  He is not ready to quit at this time  Explained that there are a variety of things that we can give him to help with smoking cessation  He will let us know when he is ready

## 2023-05-11 NOTE — ASSESSMENT & PLAN NOTE
Spoke with Violeta Linda at [facility] about pt's medication error of taking too many BP meds before this hospitalization. Spoke about results from CT scan, CXR, Labs most recent and prior admission 6/11 for comparison, vitals,  Pt medical history, and current medications given to help with consistent low BP since admission to the unit. Stable on Lipitor  Labs ordered to assess

## 2023-05-11 NOTE — ASSESSMENT & PLAN NOTE
Script for bentyl to be used as needed  Suggested trying Metamucil or fiber supplements to see if this improves stools  He had an EGD and colonoscopy in 5/2019 for same issue  It was not recommended to have repeat colonoscopy until age 48

## 2023-05-26 DIAGNOSIS — G43.109 MIGRAINE WITH AURA AND WITHOUT STATUS MIGRAINOSUS, NOT INTRACTABLE: ICD-10-CM

## 2023-05-26 RX ORDER — PROPRANOLOL HYDROCHLORIDE 40 MG/1
40 TABLET ORAL EVERY 12 HOURS SCHEDULED
Qty: 60 TABLET | Refills: 2 | Status: SHIPPED | OUTPATIENT
Start: 2023-05-26

## 2023-06-26 DIAGNOSIS — K21.00 GASTROESOPHAGEAL REFLUX DISEASE WITH ESOPHAGITIS WITHOUT HEMORRHAGE: ICD-10-CM

## 2023-06-26 DIAGNOSIS — K44.9 HIATAL HERNIA: ICD-10-CM

## 2023-06-26 RX ORDER — OMEPRAZOLE 20 MG/1
20 CAPSULE, DELAYED RELEASE ORAL
Qty: 30 CAPSULE | Refills: 2 | Status: SHIPPED | OUTPATIENT
Start: 2023-06-26

## 2023-08-24 DIAGNOSIS — G43.109 MIGRAINE WITH AURA AND WITHOUT STATUS MIGRAINOSUS, NOT INTRACTABLE: ICD-10-CM

## 2023-08-24 RX ORDER — PROPRANOLOL HYDROCHLORIDE 40 MG/1
40 TABLET ORAL EVERY 12 HOURS SCHEDULED
Qty: 60 TABLET | Refills: 2 | Status: SHIPPED | OUTPATIENT
Start: 2023-08-24

## 2023-09-26 DIAGNOSIS — K44.9 HIATAL HERNIA: ICD-10-CM

## 2023-09-26 DIAGNOSIS — K21.00 GASTROESOPHAGEAL REFLUX DISEASE WITH ESOPHAGITIS WITHOUT HEMORRHAGE: ICD-10-CM

## 2023-09-26 RX ORDER — OMEPRAZOLE 20 MG/1
20 CAPSULE, DELAYED RELEASE ORAL
Qty: 30 CAPSULE | Refills: 2 | Status: SHIPPED | OUTPATIENT
Start: 2023-09-26

## 2023-11-14 ENCOUNTER — OFFICE VISIT (OUTPATIENT)
Dept: FAMILY MEDICINE CLINIC | Facility: CLINIC | Age: 43
End: 2023-11-14
Payer: COMMERCIAL

## 2023-11-14 VITALS
SYSTOLIC BLOOD PRESSURE: 124 MMHG | HEART RATE: 70 BPM | DIASTOLIC BLOOD PRESSURE: 68 MMHG | BODY MASS INDEX: 31.09 KG/M2 | WEIGHT: 186.6 LBS | OXYGEN SATURATION: 97 % | HEIGHT: 65 IN

## 2023-11-14 DIAGNOSIS — G43.109 MIGRAINE WITH AURA AND WITHOUT STATUS MIGRAINOSUS, NOT INTRACTABLE: ICD-10-CM

## 2023-11-14 DIAGNOSIS — M51.16 LUMBAR DISC HERNIATION WITH RADICULOPATHY: ICD-10-CM

## 2023-11-14 DIAGNOSIS — Z00.00 ANNUAL PHYSICAL EXAM: Primary | ICD-10-CM

## 2023-11-14 DIAGNOSIS — K21.00 GASTROESOPHAGEAL REFLUX DISEASE WITH ESOPHAGITIS WITHOUT HEMORRHAGE: ICD-10-CM

## 2023-11-14 DIAGNOSIS — Z23 ENCOUNTER FOR IMMUNIZATION: ICD-10-CM

## 2023-11-14 DIAGNOSIS — K58.9 IRRITABLE BOWEL SYNDROME, UNSPECIFIED TYPE: ICD-10-CM

## 2023-11-14 PROCEDURE — 90686 IIV4 VACC NO PRSV 0.5 ML IM: CPT | Performed by: PHYSICIAN ASSISTANT

## 2023-11-14 PROCEDURE — 99396 PREV VISIT EST AGE 40-64: CPT | Performed by: PHYSICIAN ASSISTANT

## 2023-11-14 PROCEDURE — 90471 IMMUNIZATION ADMIN: CPT | Performed by: PHYSICIAN ASSISTANT

## 2023-11-14 NOTE — PATIENT INSTRUCTIONS
Wellness Visit for Adults   AMBULATORY CARE:   A wellness visit  is when you see your healthcare provider to get screened for health problems. Your healthcare provider will also give you advice on how to stay healthy. Write down your questions so you remember to ask them. Ask your healthcare provider how often you should have a wellness visit. What happens at a wellness visit:  Your healthcare provider will ask about your health, and your family history of health problems. This includes high blood pressure, heart disease, and cancer. He or she will ask if you have symptoms that concern you, if you smoke, and about your mood. You may also be asked about your intake of medicines, supplements, food, and alcohol. Any of the following may be done: Your weight  will be checked. Your height may also be checked so your body mass index (BMI) can be calculated. Your BMI shows if you are at a healthy weight. Your blood pressure  and heart rate will be checked. Your temperature may also be checked. Blood and urine tests  may be done. Blood tests may be done to check your cholesterol levels. Abnormal cholesterol levels increase your risk for heart disease and stroke. You may also need a blood or urine test to check for diabetes if you are at increased risk. Urine tests may be done to look for signs of an infection or kidney disease. A physical exam  includes checking your heartbeat and lungs with a stethoscope. Your healthcare provider may also check your skin to look for sun damage. Screening tests  may be recommended. A screening test is done to check for diseases that may not cause symptoms. The screening tests you may need depend on your age, gender, family history, and lifestyle habits. For example, colorectal screening may be recommended if you are 48years old or older. Screening tests you need if you are a woman:   A Pap smear  is used to screen for cervical cancer.  Pap smears are usually done every 3 to 5 years depending on your age. You may need them more often if you have had abnormal Pap smear test results in the past. Ask your healthcare provider how often you should have a Pap smear. A mammogram  is an x-ray of your breasts to screen for breast cancer. Experts recommend mammograms every 2 years starting at age 48 years. You may need a mammogram at age 52 years or younger if you have an increased risk for breast cancer. Talk to your healthcare provider about when you should start having mammograms and how often you need them. Vaccines you may need:   Get an influenza vaccine  every year. The influenza vaccine protects you from the flu. Several types of viruses cause the flu. The viruses change over time, so new vaccines are made each year. Get a tetanus-diphtheria (Td) booster vaccine  every 10 years. This vaccine protects you against tetanus and diphtheria. Tetanus is a severe infection that may cause painful muscle spasms and lockjaw. Diphtheria is a severe bacterial infection that causes a thick covering in the back of your mouth and throat. Get a human papillomavirus (HPV) vaccine  if you are female and aged 23 to 32 or male 23 to 24 and never received it. This vaccine protects you from HPV infection. HPV is the most common infection spread by sexual contact. HPV may also cause vaginal, penile, and anal cancers. Get a pneumococcal vaccine  if you are aged 72 years or older. The pneumococcal vaccine is an injection given to protect you from pneumococcal disease. Pneumococcal disease is an infection caused by pneumococcal bacteria. The infection may cause pneumonia, meningitis, or an ear infection. Get a shingles vaccine  if you are 60 or older, even if you have had shingles before. The shingles vaccine is an injection to protect you from the varicella-zoster virus. This is the same virus that causes chickenpox.  Shingles is a painful rash that develops in people who had chickenpox or have been exposed to the virus. How to eat healthy:  My Plate is a model for planning healthy meals. It shows the types and amounts of foods that should go on your plate. Fruits and vegetables make up about half of your plate, and grains and protein make up the other half. A serving of dairy is included on the side of your plate. The amount of calories and serving sizes you need depends on your age, gender, weight, and height. Examples of healthy foods are listed below:  Eat a variety of vegetables  such as dark green, red, and orange vegetables. You can also include canned vegetables low in sodium (salt) and frozen vegetables without added butter or sauces. Eat a variety of fresh fruits , canned fruit in 100% juice, frozen fruit, and dried fruit. Include whole grains. At least half of the grains you eat should be whole grains. Examples include whole-wheat bread, wheat pasta, brown rice, and whole-grain cereals such as oatmeal.    Eat a variety of protein foods such as seafood (fish and shellfish), lean meat, and poultry without skin (turkey and chicken). Examples of lean meats include pork leg, shoulder, or tenderloin, and beef round, sirloin, tenderloin, and extra lean ground beef. Other protein foods include eggs and egg substitutes, beans, peas, soy products, nuts, and seeds. Choose low-fat dairy products such as skim or 1% milk or low-fat yogurt, cheese, and cottage cheese. Limit unhealthy fats  such as butter, hard margarine, and shortening. Exercise:  Exercise at least 30 minutes per day on most days of the week. Some examples of exercise include walking, biking, dancing, and swimming. You can also fit in more physical activity by taking the stairs instead of the elevator or parking farther away from stores. Include muscle strengthening activities 2 days each week. Regular exercise provides many health benefits.  It helps you manage your weight, and decreases your risk for type 2 diabetes, heart disease, stroke, and high blood pressure. Exercise can also help improve your mood. Ask your healthcare provider about the best exercise plan for you. General health and safety guidelines:   Do not smoke. Nicotine and other chemicals in cigarettes and cigars can cause lung damage. Ask your healthcare provider for information if you currently smoke and need help to quit. E-cigarettes or smokeless tobacco still contain nicotine. Talk to your healthcare provider before you use these products. Limit alcohol. A drink of alcohol is 12 ounces of beer, 5 ounces of wine, or 1½ ounces of liquor. Lose weight, if needed. Being overweight increases your risk of certain health conditions. These include heart disease, high blood pressure, type 2 diabetes, and certain types of cancer. Protect your skin. Do not sunbathe or use tanning beds. Use sunscreen with a SPF 15 or higher. Apply sunscreen at least 15 minutes before you go outside. Reapply sunscreen every 2 hours. Wear protective clothing, hats, and sunglasses when you are outside. Drive safely. Always wear your seatbelt. Make sure everyone in your car wears a seatbelt. A seatbelt can save your life if you are in an accident. Do not use your cell phone when you are driving. This could distract you and cause an accident. Pull over if you need to make a call or send a text message. Practice safe sex. Use latex condoms if are sexually active and have more than one partner. Your healthcare provider may recommend screening tests for sexually transmitted infections (STIs). Wear helmets, lifejackets, and protective gear. Always wear a helmet when you ride a bike or motorcycle, go skiing, or play sports that could cause a head injury. Wear protective equipment when you play sports. Wear a lifejacket when you are on a boat or doing water sports.     © Copyright [employer]ith[employer] William 2023 Information is for End User's use only and may not be sold, redistributed or otherwise used for commercial purposes. The above information is an  only. It is not intended as medical advice for individual conditions or treatments. Talk to your doctor, nurse or pharmacist before following any medical regimen to see if it is safe and effective for you.

## 2023-11-14 NOTE — PROGRESS NOTES
[facility] PRIMARY [facility]    NAME: Leticia Heck  AGE: 44 y.o. SEX: male  : 1980     DATE: 2023     Assessment and Plan:     Problem List Items Addressed This Visit        Digestive    Gastroesophageal reflux disease with esophagitis without hemorrhage     Stable. Continue omeprazole         Irritable bowel syndrome     Stable. Patient had an EGD and colonoscopy in May 2019. Was not recommended to repeat colonoscopy until age 49. Symptoms are intermittent. He has not tried Bentyl. He does not feel he needs medication at this time. Cardiovascular and Mediastinum    Migraine with aura and without status migrainosus, not intractable     Stable. Continue propranolol 40 mg twice daily            Nervous and Auditory    Lumbar disc herniation with radiculopathy     Stable. Patient was discharged from neurosurgery. He admits that pain is manageable. Other Visit Diagnoses     Annual physical exam    -  Primary    Encounter for immunization        Relevant Orders    influenza vaccine, quadrivalent, 0.5 mL, preservative-free, for adult and pediatric patients 6 mos+ (AFLURIA, FLUARIX, FLULAVAL, FLUZONE) (Completed)    BMI 31.0-31.9,adult              Immunizations and preventive care screenings were discussed with patient today. Appropriate education was printed on patient's after visit summary. Counseling:  Alcohol/drug use: discussed moderation in alcohol intake, the recommendations for healthy alcohol use, and avoidance of illicit drug use. Dental Health: discussed importance of regular tooth brushing, flossing, and dental visits. Injury prevention: discussed safety/seat belts, safety helmets, smoke detectors, carbon dioxide detectors, and smoking near bedding or upholstery.   Sexual health: discussed sexually transmitted diseases, partner selection, use of condoms, avoidance of unintended pregnancy, and contraceptive alternatives. Exercise: the importance of regular exercise/physical activity was discussed. Recommend exercise 3-5 times per week for at least 30 minutes. BMI Counseling: Body mass index is 31.05 kg/m². The BMI is above normal. Nutrition recommendations include decreasing fast food intake and consuming healthier snacks. Exercise recommendations include exercising 3-5 times per week. Rationale for BMI follow-up plan is due to patient being overweight or obese. Depression Screening and Follow-up Plan: Patient was screened for depression during today's encounter. They screened negative with a PHQ-2 score of 0. Return in 6 months (on 5/14/2024) for Next scheduled follow up. Chief Complaint:     Chief Complaint   Patient presents with   • Follow-up     6 month follow up       History of Present Illness:     Adult Annual Physical   Patient here for a comprehensive physical exam. The patient reports no problems. He admits that his chronic problems are stable as stated above. He still experiences a lot of back pain and numbness in his left lower extremity, but it is manageable. He is due for repeat blood work. He is overdue for an eye exam and dental cleaning. Diet and Physical Activity  Diet/Nutrition: limited junk food. Exercise: no formal exercise. Depression Screening  PHQ-2/9 Depression Screening    Little interest or pleasure in doing things: 0 - not at all  Feeling down, depressed, or hopeless: 0 - not at all  PHQ-2 Score: 0  PHQ-2 Interpretation: Negative depression screen       General Health  Sleep: sleeps well. Hearing: normal - bilateral.  Vision: no vision problems and most recent eye exam >1 year ago. Dental: no dental visits for >1 year, brushes teeth twice daily, and does not floss. Review of Systems:     Review of Systems   Constitutional:  Negative for chills, diaphoresis, fatigue and fever.    HENT:  Negative for congestion, ear pain, postnasal drip, rhinorrhea, sneezing, sore throat and trouble swallowing. Eyes:  Negative for pain and visual disturbance. Respiratory:  Negative for apnea, cough, shortness of breath and wheezing. Cardiovascular:  Negative for chest pain and palpitations. Gastrointestinal:  Positive for abdominal pain (Intermittent). Negative for constipation, diarrhea, nausea and vomiting. Genitourinary:  Negative for dysuria. Musculoskeletal:  Positive for arthralgias, back pain and gait problem. Negative for myalgias. Neurological:  Positive for numbness (Left lower extremity). Negative for dizziness, syncope, weakness, light-headedness and headaches. Psychiatric/Behavioral:  Negative for suicidal ideas. The patient is not nervous/anxious.        Past Medical History:     Past Medical History:   Diagnosis Date   • Diarrhea       Past Surgical History:     Past Surgical History:   Procedure Laterality Date   • BACK SURGERY      herniated disk removal   • FINGER AMPUTATION Right     reattachment of right thumb   • FINGER SURGERY Right     thumb reattached       Family History:     Family History   Problem Relation Age of Onset   • Breast cancer Mother    • Hypertension Mother    • Diabetes Cousin       Social History:     Social History     Socioeconomic History   • Marital status: /Civil Union     Spouse name: None   • Number of children: 2   • Years of education: None   • Highest education level: High school graduate   Occupational History   • None   Tobacco Use   • Smoking status: Every Day     Packs/day: 0.50     Types: Cigarettes   • Smokeless tobacco: Never   Vaping Use   • Vaping Use: Never used   Substance and Sexual Activity   • Alcohol use: Not Currently     Comment: rarely   • Drug use: No   • Sexual activity: Yes   Other Topics Concern   • None   Social History Narrative    Functioning activity level: Participates in moderate activities outside of the home and sedentary/light activities inside of the home Lives with spouse     Social Determinants of Health     Financial Resource Strain: Not on file   Food Insecurity: Not on file   Transportation Needs: Not on file   Physical Activity: Not on file   Stress: Not on file   Social Connections: Not on file   Intimate Partner Violence: Not on file   Housing Stability: Not on file      Current Medications:     Current Outpatient Medications   Medication Sig Dispense Refill   • atorvastatin (LIPITOR) 20 mg tablet Take 1 tablet (20 mg total) by mouth daily 30 tablet 5   • omeprazole (PriLOSEC) 20 mg delayed release capsule Take 1 capsule (20 mg total) by mouth daily before breakfast 30 capsule 2   • propranolol (INDERAL) 40 mg tablet Take 1 tablet (40 mg total) by mouth every 12 (twelve) hours 60 tablet 2   • dicyclomine (BENTYL) 10 mg capsule Take 1 capsule (10 mg total) by mouth 3 (three) times a day as needed (stomach cramping) (Patient not taking: Reported on 11/14/2023) 30 capsule 0     No current facility-administered medications for this visit. Allergies: Allergies   Allergen Reactions   • Strawberry Extract - Food Allergy Rash      Physical Exam:     /68   Pulse 70   Ht 5' 5" (1.651 m)   Wt 84.6 kg (186 lb 9.6 oz)   SpO2 97%   BMI 31.05 kg/m²     Physical Exam  Vitals and nursing note reviewed. Constitutional:       General: He is not in acute distress. Appearance: He is well-developed. HENT:      Head: Normocephalic and atraumatic. Right Ear: Tympanic membrane, ear canal and external ear normal.      Left Ear: Tympanic membrane, ear canal and external ear normal.      Nose: Nose normal.      Mouth/Throat:      Mouth: Mucous membranes are moist.      Pharynx: No oropharyngeal exudate or posterior oropharyngeal erythema. Eyes:      Extraocular Movements: Extraocular movements intact. Conjunctiva/sclera: Conjunctivae normal.   Cardiovascular:      Rate and Rhythm: Normal rate and regular rhythm.       Heart sounds: No murmur heard.  Pulmonary:      Effort: Pulmonary effort is normal. No respiratory distress. Breath sounds: Normal breath sounds. No wheezing, rhonchi or rales. Abdominal:      Palpations: Abdomen is soft. Tenderness: There is no abdominal tenderness. Musculoskeletal:         General: No swelling. Cervical back: Neck supple. Lumbar back: Tenderness present. Decreased range of motion. Skin:     General: Skin is warm and dry. Capillary Refill: Capillary refill takes less than 2 seconds. Neurological:      Mental Status: He is alert. Gait: Gait abnormal (Patient ambulates with a cane).    Psychiatric:         Mood and Affect: Mood normal.          EDGAR Ford

## 2023-11-14 NOTE — ASSESSMENT & PLAN NOTE
Stable. Patient had an EGD and colonoscopy in May 2019. Was not recommended to repeat colonoscopy until age 49. Symptoms are intermittent. He has not tried Bentyl. He does not feel he needs medication at this time.

## 2023-11-16 ENCOUNTER — LAB (OUTPATIENT)
Dept: LAB | Facility: MEDICAL CENTER | Age: 43
End: 2023-11-16
Payer: COMMERCIAL

## 2023-11-16 DIAGNOSIS — R73.01 ELEVATED FASTING GLUCOSE: ICD-10-CM

## 2023-11-16 DIAGNOSIS — E78.5 DYSLIPIDEMIA: ICD-10-CM

## 2023-11-16 DIAGNOSIS — K21.00 GASTROESOPHAGEAL REFLUX DISEASE WITH ESOPHAGITIS WITHOUT HEMORRHAGE: ICD-10-CM

## 2023-11-16 LAB
ALBUMIN SERPL BCP-MCNC: 4.2 G/DL (ref 3.5–5)
ALP SERPL-CCNC: 86 U/L (ref 34–104)
ALT SERPL W P-5'-P-CCNC: 41 U/L (ref 7–52)
ANION GAP SERPL CALCULATED.3IONS-SCNC: 6 MMOL/L
AST SERPL W P-5'-P-CCNC: 31 U/L (ref 13–39)
BASOPHILS # BLD AUTO: 0.08 THOUSANDS/ÂΜL (ref 0–0.1)
BASOPHILS NFR BLD AUTO: 1 % (ref 0–1)
BILIRUB SERPL-MCNC: 0.45 MG/DL (ref 0.2–1)
BUN SERPL-MCNC: 13 MG/DL (ref 5–25)
CALCIUM SERPL-MCNC: 9.3 MG/DL (ref 8.4–10.2)
CHLORIDE SERPL-SCNC: 104 MMOL/L (ref 96–108)
CHOLEST SERPL-MCNC: 140 MG/DL
CO2 SERPL-SCNC: 28 MMOL/L (ref 21–32)
CREAT SERPL-MCNC: 0.99 MG/DL (ref 0.6–1.3)
EOSINOPHIL # BLD AUTO: 0.3 THOUSAND/ÂΜL (ref 0–0.61)
EOSINOPHIL NFR BLD AUTO: 4 % (ref 0–6)
ERYTHROCYTE [DISTWIDTH] IN BLOOD BY AUTOMATED COUNT: 13.9 % (ref 11.6–15.1)
EST. AVERAGE GLUCOSE BLD GHB EST-MCNC: 134 MG/DL
GFR SERPL CREATININE-BSD FRML MDRD: 93 ML/MIN/1.73SQ M
GLUCOSE P FAST SERPL-MCNC: 111 MG/DL (ref 65–99)
HBA1C MFR BLD: 6.3 %
HCT VFR BLD AUTO: 47.7 % (ref 36.5–49.3)
HDLC SERPL-MCNC: 33 MG/DL
HGB BLD-MCNC: 15 G/DL (ref 12–17)
IMM GRANULOCYTES # BLD AUTO: 0.03 THOUSAND/UL (ref 0–0.2)
IMM GRANULOCYTES NFR BLD AUTO: 0 % (ref 0–2)
LDLC SERPL CALC-MCNC: 57 MG/DL (ref 0–100)
LYMPHOCYTES # BLD AUTO: 2.54 THOUSANDS/ÂΜL (ref 0.6–4.47)
LYMPHOCYTES NFR BLD AUTO: 31 % (ref 14–44)
MCH RBC QN AUTO: 27.4 PG (ref 26.8–34.3)
MCHC RBC AUTO-ENTMCNC: 31.4 G/DL (ref 31.4–37.4)
MCV RBC AUTO: 87 FL (ref 82–98)
MONOCYTES # BLD AUTO: 0.73 THOUSAND/ÂΜL (ref 0.17–1.22)
MONOCYTES NFR BLD AUTO: 9 % (ref 4–12)
NEUTROPHILS # BLD AUTO: 4.48 THOUSANDS/ÂΜL (ref 1.85–7.62)
NEUTS SEG NFR BLD AUTO: 55 % (ref 43–75)
NONHDLC SERPL-MCNC: 107 MG/DL
NRBC BLD AUTO-RTO: 0 /100 WBCS
PLATELET # BLD AUTO: 206 THOUSANDS/UL (ref 149–390)
PMV BLD AUTO: 11.2 FL (ref 8.9–12.7)
POTASSIUM SERPL-SCNC: 4.3 MMOL/L (ref 3.5–5.3)
PROT SERPL-MCNC: 6.9 G/DL (ref 6.4–8.4)
RBC # BLD AUTO: 5.47 MILLION/UL (ref 3.88–5.62)
SODIUM SERPL-SCNC: 138 MMOL/L (ref 135–147)
TRIGL SERPL-MCNC: 248 MG/DL
WBC # BLD AUTO: 8.16 THOUSAND/UL (ref 4.31–10.16)

## 2023-11-16 PROCEDURE — 80053 COMPREHEN METABOLIC PANEL: CPT

## 2023-11-16 PROCEDURE — 85025 COMPLETE CBC W/AUTO DIFF WBC: CPT

## 2023-11-16 PROCEDURE — 36415 COLL VENOUS BLD VENIPUNCTURE: CPT

## 2023-11-16 PROCEDURE — 80061 LIPID PANEL: CPT

## 2023-11-16 PROCEDURE — 83036 HEMOGLOBIN GLYCOSYLATED A1C: CPT

## 2023-11-17 DIAGNOSIS — E78.1 HYPERTRIGLYCERIDEMIA: ICD-10-CM

## 2023-11-17 DIAGNOSIS — R73.09 ELEVATED GLUCOSE: Primary | ICD-10-CM

## 2023-11-28 DIAGNOSIS — G43.109 MIGRAINE WITH AURA AND WITHOUT STATUS MIGRAINOSUS, NOT INTRACTABLE: ICD-10-CM

## 2023-11-28 DIAGNOSIS — E78.5 DYSLIPIDEMIA: ICD-10-CM

## 2023-11-28 RX ORDER — ATORVASTATIN CALCIUM 20 MG/1
20 TABLET, FILM COATED ORAL DAILY
Qty: 30 TABLET | Refills: 5 | Status: SHIPPED | OUTPATIENT
Start: 2023-11-28

## 2023-11-28 RX ORDER — PROPRANOLOL HYDROCHLORIDE 40 MG/1
40 TABLET ORAL EVERY 12 HOURS SCHEDULED
Qty: 60 TABLET | Refills: 5 | Status: SHIPPED | OUTPATIENT
Start: 2023-11-28

## 2023-12-29 DIAGNOSIS — K44.9 HIATAL HERNIA: ICD-10-CM

## 2023-12-29 DIAGNOSIS — K21.00 GASTROESOPHAGEAL REFLUX DISEASE WITH ESOPHAGITIS WITHOUT HEMORRHAGE: ICD-10-CM

## 2023-12-29 RX ORDER — OMEPRAZOLE 20 MG/1
20 CAPSULE, DELAYED RELEASE ORAL
Qty: 30 CAPSULE | Refills: 2 | Status: SHIPPED | OUTPATIENT
Start: 2023-12-29

## 2024-02-03 ENCOUNTER — OFFICE VISIT (OUTPATIENT)
Dept: URGENT CARE | Facility: MEDICAL CENTER | Age: 44
End: 2024-02-03
Payer: COMMERCIAL

## 2024-02-03 ENCOUNTER — APPOINTMENT (OUTPATIENT)
Dept: RADIOLOGY | Facility: MEDICAL CENTER | Age: 44
End: 2024-02-03
Payer: COMMERCIAL

## 2024-02-03 VITALS
OXYGEN SATURATION: 98 % | HEART RATE: 73 BPM | WEIGHT: 184 LBS | BODY MASS INDEX: 30.62 KG/M2 | SYSTOLIC BLOOD PRESSURE: 110 MMHG | DIASTOLIC BLOOD PRESSURE: 74 MMHG | TEMPERATURE: 98.2 F | RESPIRATION RATE: 18 BRPM

## 2024-02-03 DIAGNOSIS — S89.91XA RIGHT KNEE INJURY, INITIAL ENCOUNTER: ICD-10-CM

## 2024-02-03 DIAGNOSIS — M25.561 ACUTE PAIN OF RIGHT KNEE: ICD-10-CM

## 2024-02-03 DIAGNOSIS — S89.91XA RIGHT KNEE INJURY, INITIAL ENCOUNTER: Primary | ICD-10-CM

## 2024-02-03 PROCEDURE — 99212 OFFICE O/P EST SF 10 MIN: CPT | Performed by: PHYSICIAN ASSISTANT

## 2024-02-03 PROCEDURE — S9088 SERVICES PROVIDED IN URGENT: HCPCS | Performed by: PHYSICIAN ASSISTANT

## 2024-02-03 PROCEDURE — 73564 X-RAY EXAM KNEE 4 OR MORE: CPT

## 2024-02-03 RX ORDER — DICLOFENAC SODIUM 75 MG/1
75 TABLET, DELAYED RELEASE ORAL 2 TIMES DAILY
Qty: 15 TABLET | Refills: 0 | Status: SHIPPED | OUTPATIENT
Start: 2024-02-03

## 2024-02-03 NOTE — PATIENT INSTRUCTIONS
Keep elevated when sitting  Take Voltaren for pain and take with food  If symptoms fail to improve follow up with orthopedics.

## 2024-02-03 NOTE — PROGRESS NOTES
[facility]        NAME: Tremaine Bravo is a 43 y.o. male  : 1980    MRN: [id number]  DATE: February 3, 2024  TIME: 1:50 PM    Assessment and Plan   Right knee injury, initial encounter [S89.91XA]  1. Right knee injury, initial encounter  XR knee 4+ vw right injury    diclofenac (VOLTAREN) 75 mg EC tablet    Ambulatory Referral to Orthopedic Surgery      2. Acute pain of right knee  diclofenac (VOLTAREN) 75 mg EC tablet    Ambulatory Referral to Orthopedic Surgery            Patient Instructions     Keep elevated when sitting  Take Voltaren for pain and take with food  If symptoms fail to improve follow up with orthopedics.  Follow up with PCP in 3-5 days.  Proceed to  ER if symptoms worsen.    Chief Complaint     Chief Complaint   Patient presents with   • Knee Pain     Right knee pain. Fell on Monday. Has a previous back injury and nerve damage and his left leg gave out and fell in the dining room. Has  not taking any medications for pain         History of Present Illness       Patient's right knee gave out 5 days ago and he fell in the dining room injuring his right knee.  He is not taking any medication for pain.  Pain is primarily in the left side of his knee.        Review of Systems   Review of Systems   Constitutional:  Negative for fever.   Musculoskeletal:         Right knee pain         Current Medications       Current Outpatient Medications:   •  atorvastatin (LIPITOR) 20 mg tablet, Take 1 tablet (20 mg total) by mouth daily, Disp: 30 tablet, Rfl: 5  •  diclofenac (VOLTAREN) 75 mg EC tablet, Take 1 tablet (75 mg total) by mouth 2 (two) times a day Take with food, Disp: 15 tablet, Rfl: 0  •  omeprazole (PriLOSEC) 20 mg delayed release capsule, Take 1 capsule (20 mg total) by mouth daily before breakfast, Disp: 30 capsule, Rfl: 2  •  propranolol (INDERAL) 40 mg tablet, Take 1 tablet (40 mg total) by mouth every 12 (twelve) hours, Disp: 60 tablet, Rfl: 5  •  dicyclomine (BENTYL) 10 mg  capsule, Take 1 capsule (10 mg total) by mouth 3 (three) times a day as needed (stomach cramping) (Patient not taking: Reported on 11/14/2023), Disp: 30 capsule, Rfl: 0    Current Allergies     Allergies as of 02/03/2024 - Reviewed 02/03/2024   Allergen Reaction Noted   • Strawberry extract - food allergy Rash 03/06/2020            The following portions of the patient's history were reviewed and updated as appropriate: allergies, current medications, past family history, past medical history, past social history, past surgical history and problem list.     Past Medical History:   Diagnosis Date   • Diarrhea        Past Surgical History:   Procedure Laterality Date   • BACK SURGERY      herniated disk removal   • FINGER AMPUTATION Right     reattachment of right thumb   • FINGER SURGERY Right     thumb reattached        Family History   Problem Relation Age of Onset   • Breast cancer Mother    • Hypertension Mother    • Diabetes Cousin          Medications have been verified.        Objective   /74   Pulse 73   Temp 98.2 °F (36.8 °C)   Resp 18   Wt 83.5 kg (184 lb)   SpO2 98%   BMI 30.62 kg/m²   No LMP for male patient.       Physical Exam     Physical Exam  Vitals and nursing note reviewed.   Constitutional:       Appearance: Normal appearance.   HENT:      Head: Normocephalic.   Cardiovascular:      Rate and Rhythm: Normal rate.   Pulmonary:      Effort: Pulmonary effort is normal.   Musculoskeletal:      Comments: Right knee without swelling, ecchymosis, rashes or wounds.  Pain on palpation over the patellar tendon lateral to the patella and superior to patella.  Painful weightbearing.   Skin:     General: Skin is warm.   Neurological:      Mental Status: He is alert.     Right knee x-ray no acute osseous abnormality

## 2024-03-27 DIAGNOSIS — K21.00 GASTROESOPHAGEAL REFLUX DISEASE WITH ESOPHAGITIS WITHOUT HEMORRHAGE: ICD-10-CM

## 2024-03-27 DIAGNOSIS — K44.9 HIATAL HERNIA: ICD-10-CM

## 2024-03-27 RX ORDER — OMEPRAZOLE 20 MG/1
20 CAPSULE, DELAYED RELEASE ORAL
Qty: 30 CAPSULE | Refills: 0 | Status: SHIPPED | OUTPATIENT
Start: 2024-03-27

## 2024-05-02 DIAGNOSIS — K21.00 GASTROESOPHAGEAL REFLUX DISEASE WITH ESOPHAGITIS WITHOUT HEMORRHAGE: ICD-10-CM

## 2024-05-02 DIAGNOSIS — K44.9 HIATAL HERNIA: ICD-10-CM

## 2024-05-02 RX ORDER — OMEPRAZOLE 20 MG/1
20 CAPSULE, DELAYED RELEASE ORAL
Qty: 30 CAPSULE | Refills: 5 | Status: SHIPPED | OUTPATIENT
Start: 2024-05-02

## 2024-05-02 NOTE — TELEPHONE ENCOUNTER
Reason for call:   [x] Refill   [] Prior Auth  [] Other:     Office:   [x] PCP/Provider - Pun  [] Specialty/Provider -     Medication: omeprazole (PriLOSEC) 20 mg delayed release capsule     Dose/Frequency: 20 mg, Oral, Daily before breakfast     Quantity: 30    Pharmacy: [facility] [address] - [address] [place] [address], [address]     Does the patient have enough for 3 days?   [] Yes   [x] No - Send as HP to POD

## 2024-05-14 ENCOUNTER — OFFICE VISIT (OUTPATIENT)
Dept: FAMILY MEDICINE CLINIC | Facility: CLINIC | Age: 44
End: 2024-05-14
Payer: COMMERCIAL

## 2024-05-14 VITALS
OXYGEN SATURATION: 96 % | BODY MASS INDEX: 30.82 KG/M2 | HEART RATE: 68 BPM | HEIGHT: 65 IN | SYSTOLIC BLOOD PRESSURE: 130 MMHG | DIASTOLIC BLOOD PRESSURE: 82 MMHG | WEIGHT: 185 LBS

## 2024-05-14 DIAGNOSIS — R73.09 ELEVATED GLUCOSE: Primary | ICD-10-CM

## 2024-05-14 DIAGNOSIS — Z72.0 TOBACCO ABUSE: ICD-10-CM

## 2024-05-14 DIAGNOSIS — E78.5 DYSLIPIDEMIA: ICD-10-CM

## 2024-05-14 DIAGNOSIS — K58.9 IRRITABLE BOWEL SYNDROME, UNSPECIFIED TYPE: ICD-10-CM

## 2024-05-14 DIAGNOSIS — G43.109 MIGRAINE WITH AURA AND WITHOUT STATUS MIGRAINOSUS, NOT INTRACTABLE: ICD-10-CM

## 2024-05-14 DIAGNOSIS — M51.36 DISC DEGENERATION, LUMBAR: ICD-10-CM

## 2024-05-14 DIAGNOSIS — K21.00 GASTROESOPHAGEAL REFLUX DISEASE WITH ESOPHAGITIS WITHOUT HEMORRHAGE: ICD-10-CM

## 2024-05-14 DIAGNOSIS — R05.3 CHRONIC COUGH: ICD-10-CM

## 2024-05-14 PROCEDURE — 99214 OFFICE O/P EST MOD 30 MIN: CPT | Performed by: PHYSICIAN ASSISTANT

## 2024-05-14 RX ORDER — ALBUTEROL SULFATE 90 UG/1
2 AEROSOL, METERED RESPIRATORY (INHALATION) EVERY 6 HOURS PRN
Qty: 18 G | Refills: 2 | Status: SHIPPED | OUTPATIENT
Start: 2024-05-14

## 2024-05-14 NOTE — ASSESSMENT & PLAN NOTE
Encouraged patient to quit.   He is not ready to quit at this time.   Explained that there are a variety of things that we can give him to help with smoking cessation. He will let us know when he is ready.

## 2024-05-14 NOTE — PROGRESS NOTES
Name: Tremaine Bravo      : 1980      MRN: [id number]  Encounter Provider: Beckie Dumont PA-C  Encounter Date: 2024   Encounter department: [facility]    Assessment & Plan     1. Elevated glucose  Assessment & Plan:  Reviewed labs with patient.  He is due for repeat labs.  Encouraged him to try to follow a low-carb/sugar diet.      2. Irritable bowel syndrome, unspecified type  Assessment & Plan:  Stable.  Patient had an EGD and colonoscopy in May 2019.  Was not recommended to repeat colonoscopy until age 51.  Symptoms are intermittent.  He has not tried Bentyl.  He does not feel he needs medication at this time.      3. Gastroesophageal reflux disease with esophagitis without hemorrhage  Assessment & Plan:  Stable.  Continue omeprazole      4. Dyslipidemia  Assessment & Plan:  Stable on Lipitor  Labs ordered to assess      5. Migraine with aura and without status migrainosus, not intractable  Assessment & Plan:  Stable.  Continue propranolol 40 mg twice daily      6. Disc degeneration, lumbar  Assessment & Plan:  Stable. Continue to follow with neurosurgery.      7. Tobacco abuse  Assessment & Plan:  Encouraged patient to quit.   He is not ready to quit at this time.   Explained that there are a variety of things that we can give him to help with smoking cessation. He will let us know when he is ready.       8. Chronic cough  Assessment & Plan:  Encourage patient to quit smoking.  He is not interested at this time.  Will get chest x-ray and pulmonary function testing.  Will do trial of albuterol as needed.    Orders:  -     XR chest pa & lateral; Future; Expected date: 2024  -     Complete PFT with post bronchodilator; Future  -     albuterol (Ventolin HFA) 90 mcg/act inhaler; Inhale 2 puffs every 6 (six) hours as needed for wheezing or shortness of breath           Tray Penaloza is a pleasant 43-year-old male who is here today for a routine follow-up.  He did not complete repeat  blood work yet, but would like to review labs from November.  He admits that he has been trying to watch his diet.  His GERD and IBS have been stable.  He is also complaining of a chronic cough.  He denies any triggers.  He continues to smoke.  He is not interested in quitting smoking at this time.  He is still dealing with chronic back pain.  He is not following with neurosurgery at this time.  He is not interested in referral to pain management at this time.      Review of Systems   Constitutional:  Negative for chills, diaphoresis, fatigue and fever.   HENT:  Negative for congestion, ear pain, postnasal drip, rhinorrhea, sneezing, sore throat and trouble swallowing.    Eyes:  Negative for pain and visual disturbance.   Respiratory:  Negative for apnea, cough, shortness of breath and wheezing.    Cardiovascular:  Negative for chest pain and palpitations.   Gastrointestinal:  Negative for abdominal pain, constipation, diarrhea, nausea and vomiting.   Genitourinary:  Negative for dysuria and hematuria.   Musculoskeletal:  Positive for arthralgias and back pain. Negative for gait problem and myalgias.   Neurological:  Negative for dizziness, syncope, weakness, light-headedness, numbness and headaches.   Psychiatric/Behavioral:  Negative for suicidal ideas. The patient is not nervous/anxious.        Current Outpatient Medications on File Prior to Visit   Medication Sig   • atorvastatin (LIPITOR) 20 mg tablet Take 1 tablet (20 mg total) by mouth daily   • omeprazole (PriLOSEC) 20 mg delayed release capsule Take 1 capsule (20 mg total) by mouth daily before breakfast   • propranolol (INDERAL) 40 mg tablet Take 1 tablet (40 mg total) by mouth every 12 (twelve) hours   • [DISCONTINUED] diclofenac (VOLTAREN) 75 mg EC tablet Take 1 tablet (75 mg total) by mouth 2 (two) times a day Take with food   • [DISCONTINUED] dicyclomine (BENTYL) 10 mg capsule Take 1 capsule (10 mg total) by mouth 3 (three) times a day as needed  "(stomach cramping) (Patient not taking: Reported on 11/14/2023)       Objective     /82   Pulse 68   Ht 5' 5\" (1.651 m)   Wt 83.9 kg (185 lb)   SpO2 96%   BMI 30.79 kg/m²     Physical Exam  Vitals and nursing note reviewed.   Constitutional:       Appearance: He is well-developed.   HENT:      Head: Normocephalic and atraumatic.      Right Ear: Tympanic membrane, ear canal and external ear normal.      Left Ear: Tympanic membrane, ear canal and external ear normal.      Nose: Nose normal.      Mouth/Throat:      Pharynx: No oropharyngeal exudate or posterior oropharyngeal erythema.   Eyes:      Extraocular Movements: Extraocular movements intact.   Cardiovascular:      Rate and Rhythm: Normal rate and regular rhythm.      Heart sounds: Normal heart sounds. No murmur heard.     No friction rub. No gallop.   Pulmonary:      Effort: Pulmonary effort is normal. No respiratory distress.      Breath sounds: Normal breath sounds. No wheezing or rales.   Musculoskeletal:      Cervical back: Normal range of motion and neck supple.      Lumbar back: Tenderness present. Decreased range of motion.   Skin:     General: Skin is warm and dry.   Neurological:      Mental Status: He is alert and oriented to person, place, and time.      Gait: Gait abnormal (uses cane to ambulate).   Psychiatric:         Behavior: Behavior normal.         Thought Content: Thought content normal.         Judgment: Judgment normal.       Beckie Dumont PA-C    "

## 2024-05-14 NOTE — ASSESSMENT & PLAN NOTE
Encourage patient to quit smoking.  He is not interested at this time.  Will get chest x-ray and pulmonary function testing.  Will do trial of albuterol as needed.

## 2024-05-14 NOTE — ASSESSMENT & PLAN NOTE
Stable.  Patient had an EGD and colonoscopy in May 2019.  Was not recommended to repeat colonoscopy until age 51.  Symptoms are intermittent.  He has not tried Bentyl.  He does not feel he needs medication at this time.

## 2024-05-14 NOTE — ASSESSMENT & PLAN NOTE
Reviewed labs with patient.  He is due for repeat labs.  Encouraged him to try to follow a low-carb/sugar diet.

## 2024-05-31 DIAGNOSIS — E78.5 DYSLIPIDEMIA: ICD-10-CM

## 2024-05-31 DIAGNOSIS — G43.109 MIGRAINE WITH AURA AND WITHOUT STATUS MIGRAINOSUS, NOT INTRACTABLE: ICD-10-CM

## 2024-06-01 RX ORDER — PROPRANOLOL HYDROCHLORIDE 40 MG/1
40 TABLET ORAL EVERY 12 HOURS SCHEDULED
Qty: 60 TABLET | Refills: 5 | Status: SHIPPED | OUTPATIENT
Start: 2024-06-01

## 2024-06-01 RX ORDER — ATORVASTATIN CALCIUM 20 MG/1
20 TABLET, FILM COATED ORAL DAILY
Qty: 30 TABLET | Refills: 5 | Status: SHIPPED | OUTPATIENT
Start: 2024-06-01

## 2024-06-10 ENCOUNTER — APPOINTMENT (OUTPATIENT)
Dept: RADIOLOGY | Facility: MEDICAL CENTER | Age: 44
End: 2024-06-10
Payer: COMMERCIAL

## 2024-06-10 ENCOUNTER — APPOINTMENT (OUTPATIENT)
Dept: LAB | Facility: MEDICAL CENTER | Age: 44
End: 2024-06-10
Payer: COMMERCIAL

## 2024-06-10 DIAGNOSIS — R05.3 CHRONIC COUGH: ICD-10-CM

## 2024-06-10 DIAGNOSIS — E78.1 HYPERTRIGLYCERIDEMIA: ICD-10-CM

## 2024-06-10 DIAGNOSIS — R73.09 ELEVATED GLUCOSE: ICD-10-CM

## 2024-06-10 LAB
ALBUMIN SERPL BCP-MCNC: 4.3 G/DL (ref 3.5–5)
ALP SERPL-CCNC: 86 U/L (ref 34–104)
ALT SERPL W P-5'-P-CCNC: 29 U/L (ref 7–52)
ANION GAP SERPL CALCULATED.3IONS-SCNC: 10 MMOL/L (ref 4–13)
AST SERPL W P-5'-P-CCNC: 22 U/L (ref 13–39)
BILIRUB SERPL-MCNC: 0.59 MG/DL (ref 0.2–1)
BUN SERPL-MCNC: 12 MG/DL (ref 5–25)
CALCIUM SERPL-MCNC: 8.9 MG/DL (ref 8.4–10.2)
CHLORIDE SERPL-SCNC: 104 MMOL/L (ref 96–108)
CHOLEST SERPL-MCNC: 145 MG/DL
CO2 SERPL-SCNC: 27 MMOL/L (ref 21–32)
CREAT SERPL-MCNC: 1 MG/DL (ref 0.6–1.3)
EST. AVERAGE GLUCOSE BLD GHB EST-MCNC: 126 MG/DL
GFR SERPL CREATININE-BSD FRML MDRD: 92 ML/MIN/1.73SQ M
GLUCOSE P FAST SERPL-MCNC: 110 MG/DL (ref 65–99)
HBA1C MFR BLD: 6 %
HDLC SERPL-MCNC: 33 MG/DL
LDLC SERPL CALC-MCNC: 61 MG/DL (ref 0–100)
NONHDLC SERPL-MCNC: 112 MG/DL
POTASSIUM SERPL-SCNC: 3.9 MMOL/L (ref 3.5–5.3)
PROT SERPL-MCNC: 6.8 G/DL (ref 6.4–8.4)
SODIUM SERPL-SCNC: 141 MMOL/L (ref 135–147)
TRIGL SERPL-MCNC: 255 MG/DL

## 2024-06-10 PROCEDURE — 36415 COLL VENOUS BLD VENIPUNCTURE: CPT

## 2024-06-10 PROCEDURE — 80053 COMPREHEN METABOLIC PANEL: CPT

## 2024-06-10 PROCEDURE — 83036 HEMOGLOBIN GLYCOSYLATED A1C: CPT

## 2024-06-10 PROCEDURE — 80061 LIPID PANEL: CPT

## 2024-06-10 PROCEDURE — 71046 X-RAY EXAM CHEST 2 VIEWS: CPT

## 2024-06-12 DIAGNOSIS — R73.09 ELEVATED GLUCOSE: Primary | ICD-10-CM

## 2024-06-12 DIAGNOSIS — E78.5 DYSLIPIDEMIA: ICD-10-CM

## 2024-07-18 ENCOUNTER — HOSPITAL ENCOUNTER (OUTPATIENT)
Dept: PULMONOLOGY | Facility: HOSPITAL | Age: 44
End: 2024-07-18
Payer: COMMERCIAL

## 2024-07-18 DIAGNOSIS — R05.3 CHRONIC COUGH: ICD-10-CM

## 2024-07-18 PROCEDURE — 94726 PLETHYSMOGRAPHY LUNG VOLUMES: CPT

## 2024-07-18 PROCEDURE — 94760 N-INVAS EAR/PLS OXIMETRY 1: CPT

## 2024-07-18 PROCEDURE — 94060 EVALUATION OF WHEEZING: CPT

## 2024-07-18 PROCEDURE — 94726 PLETHYSMOGRAPHY LUNG VOLUMES: CPT | Performed by: INTERNAL MEDICINE

## 2024-07-18 PROCEDURE — 94060 EVALUATION OF WHEEZING: CPT | Performed by: INTERNAL MEDICINE

## 2024-07-18 PROCEDURE — 94729 DIFFUSING CAPACITY: CPT | Performed by: INTERNAL MEDICINE

## 2024-07-18 PROCEDURE — 94729 DIFFUSING CAPACITY: CPT

## 2024-07-18 RX ORDER — ALBUTEROL SULFATE 2.5 MG/3ML
2.5 SOLUTION RESPIRATORY (INHALATION) ONCE AS NEEDED
Status: COMPLETED | OUTPATIENT
Start: 2024-07-18 | End: 2024-07-18

## 2024-07-18 RX ADMIN — ALBUTEROL SULFATE 2.5 MG: 2.5 SOLUTION RESPIRATORY (INHALATION) at 12:18

## 2024-10-28 DIAGNOSIS — K44.9 HIATAL HERNIA: ICD-10-CM

## 2024-10-28 DIAGNOSIS — K21.00 GASTROESOPHAGEAL REFLUX DISEASE WITH ESOPHAGITIS WITHOUT HEMORRHAGE: ICD-10-CM

## 2024-11-19 ENCOUNTER — OFFICE VISIT (OUTPATIENT)
Dept: FAMILY MEDICINE CLINIC | Facility: CLINIC | Age: 44
End: 2024-11-19
Payer: COMMERCIAL

## 2024-11-19 VITALS
DIASTOLIC BLOOD PRESSURE: 62 MMHG | BODY MASS INDEX: 30.99 KG/M2 | SYSTOLIC BLOOD PRESSURE: 122 MMHG | HEART RATE: 65 BPM | OXYGEN SATURATION: 98 % | WEIGHT: 186 LBS | HEIGHT: 65 IN

## 2024-11-19 DIAGNOSIS — M54.16 LUMBAR RADICULOPATHY: ICD-10-CM

## 2024-11-19 DIAGNOSIS — R73.09 ELEVATED GLUCOSE: ICD-10-CM

## 2024-11-19 DIAGNOSIS — Z23 ENCOUNTER FOR IMMUNIZATION: ICD-10-CM

## 2024-11-19 DIAGNOSIS — Z00.00 ANNUAL PHYSICAL EXAM: Primary | ICD-10-CM

## 2024-11-19 DIAGNOSIS — Z72.0 TOBACCO ABUSE: ICD-10-CM

## 2024-11-19 DIAGNOSIS — E78.5 DYSLIPIDEMIA: ICD-10-CM

## 2024-11-19 PROCEDURE — 90471 IMMUNIZATION ADMIN: CPT | Performed by: PHYSICIAN ASSISTANT

## 2024-11-19 PROCEDURE — 90656 IIV3 VACC NO PRSV 0.5 ML IM: CPT | Performed by: PHYSICIAN ASSISTANT

## 2024-11-19 PROCEDURE — 99396 PREV VISIT EST AGE 40-64: CPT | Performed by: PHYSICIAN ASSISTANT

## 2024-11-19 NOTE — PATIENT INSTRUCTIONS
"Patient Education     Routine physical for adults   The Basics   Written by the doctors and editors at [facility]   What is a physical? -- A physical is a routine visit, or \"check-up,\" with your doctor. You might also hear it called a \"wellness visit\" or \"preventive visit.\"  During each visit, the doctor will:   Ask about your physical and mental health   Ask about your habits, behaviors, and lifestyle   Do an exam   Give you vaccines if needed   Talk to you about any medicines you take   Give advice about your health   Answer your questions  Getting regular check-ups is an important part of taking care of your health. It can help your doctor find and treat any problems you have. But it's also important for preventing health problems.  A routine physical is different from a \"sick visit.\" A sick visit is when you see a doctor because of a health concern or problem. Since physicals are scheduled ahead of time, you can think about what you want to ask the doctor.  How often should I get a physical? -- It depends on your age and health. In general, for people age 21 years and older:   If you are younger than 50 years, you might be able to get a physical every 3 years.   If you are 50 years or older, your doctor might recommend a physical every year.  If you have an ongoing health condition, like diabetes or high blood pressure, your doctor will probably want to see you more often.  What happens during a physical? -- In general, each visit will include:   Physical exam - The doctor or nurse will check your height, weight, heart rate, and blood pressure. They will also look at your eyes and ears. They will ask about how you are feeling and whether you have any symptoms that bother you.   Medicines - It's a good idea to bring a list of all the medicines you take to each doctor visit. Your doctor will talk to you about your medicines and answer any questions. Tell them if you are having any side effects that bother you. You " "should also tell them if you are having trouble paying for any of your medicines.   Habits and behaviors - This includes:   Your diet   Your exercise habits   Whether you smoke, drink alcohol, or use drugs   Whether you are sexually active   Whether you feel safe at home  Your doctor will talk to you about things you can do to improve your health and lower your risk of health problems. They will also offer help and support. For example, if you want to quit smoking, they can give you advice and might prescribe medicines. If you want to improve your diet or get more physical activity, they can help you with this, too.   Lab tests, if needed - The tests you get will depend on your age and situation. For example, your doctor might want to check your:   Cholesterol   Blood sugar   Iron level   Vaccines - The recommended vaccines will depend on your age, health, and what vaccines you already had. Vaccines are very important because they can prevent certain serious or deadly infections.   Discussion of screening - \"Screening\" means checking for diseases or other health problems before they cause symptoms. Your doctor can recommend screening based on your age, risk, and preferences. This might include tests to check for:   Cancer, such as breast, prostate, cervical, ovarian, colorectal, prostate, lung, or skin cancer   Sexually transmitted infections, such as chlamydia and gonorrhea   Mental health conditions like depression and anxiety  Your doctor will talk to you about the different types of screening tests. They can help you decide which screenings to have. They can also explain what the results might mean.   Answering questions - The physical is a good time to ask the doctor or nurse questions about your health. If needed, they can refer you to other doctors or specialists, too.  Adults older than 65 years often need other care, too. As you get older, your doctor will talk to you about:   How to prevent falling at " home   Hearing or vision tests   Memory testing   How to take your medicines safely   Making sure that you have the help and support you need at home  All topics are updated as new evidence becomes available and our peer review process is complete.  This topic retrieved from [employer] on: May 02, 2024.  Topic 850071 Version 1.0  Release: 32.4.3 - C32.122  © 2024 UpToDate, Inc. and/or its affiliates. All rights reserved.  Consumer Information Use and Disclaimer   Disclaimer: This generalized information is a limited summary of diagnosis, treatment, and/or medication information. It is not meant to be comprehensive and should be used as a tool to help the user understand and/or assess potential diagnostic and treatment options. It does NOT include all information about conditions, treatments, medications, side effects, or risks that may apply to a specific patient. It is not intended to be medical advice or a substitute for the medical advice, diagnosis, or treatment of a health care provider based on the health care provider's examination and assessment of a patient's specific and unique circumstances. Patients must speak with a health care provider for complete information about their health, medical questions, and treatment options, including any risks or benefits regarding use of medications. This information does not endorse any treatments or medications as safe, effective, or approved for treating a specific patient. UpToDate, Inc. and its affiliates disclaim any warranty or liability relating to this information or the use thereof.The use of this information is governed by the Terms of Use, available at https://www.woltersGreenPeak Technologiesuwer.com/en/know/clinical-effectiveness-terms. 2024© UpToDate, Inc. and its affiliates and/or licensors. All rights reserved.  Copyright   © 2024 UpToDate, Inc. and/or its affiliates. All rights reserved.

## 2024-11-19 NOTE — ASSESSMENT & PLAN NOTE
Recommended low-fat diet.  Continue Lipitor 20 mg daily  Orders:    Comprehensive metabolic panel; Future    Lipid panel; Future

## 2024-11-19 NOTE — PROGRESS NOTES
Adult Annual Physical  Name: Tremaine Bravo      : 1980      MRN: [id number]  Encounter Provider: Beckie Dumont PA-C  Encounter Date: 2024   Encounter department: [facility]    Assessment & Plan  Annual physical exam  Routine labs ordered  Encouraged patient to schedule eye exam  Encouraged patient to schedule routine dental cleaning  Flu shot given  Orders:    Comprehensive metabolic panel; Future    CBC and differential; Future    TSH, 3rd generation with Free T4 reflex; Future    Lipid panel; Future    Hemoglobin A1C; Future    Encounter for immunization    Orders:    influenza vaccine preservative-free 0.5 mL IM (Fluzone, Afluria, Fluarix, Flulaval)    BMI 30.0-30.9,adult         Dyslipidemia  Recommended low-fat diet.  Continue Lipitor 20 mg daily  Orders:    Comprehensive metabolic panel; Future    Lipid panel; Future    Tobacco abuse  Smoking cessation education provided.  Patient is not interested in quitting at this time.       Elevated glucose  Labs ordered to evaluate.  Recommended low-carb diet.  Orders:    Comprehensive metabolic panel; Future    Hemoglobin A1C; Future    Lumbar radiculopathy  Stable. Continue to follow with neurosurgery.         Immunizations and preventive care screenings were discussed with patient today. Appropriate education was printed on patient's after visit summary.      Counseling:  Alcohol/drug use: discussed moderation in alcohol intake, the recommendations for healthy alcohol use, and avoidance of illicit drug use.  Dental Health: discussed importance of regular tooth brushing, flossing, and dental visits.  Injury prevention: discussed safety/seat belts, safety helmets, smoke detectors, carbon monoxide detectors, and smoking near bedding or upholstery.  Sexual health: discussed sexually transmitted diseases, partner selection, use of condoms, avoidance of unintended pregnancy, and contraceptive alternatives.  Exercise: the importance of regular  exercise/physical activity was discussed. Recommend exercise 3-5 times per week for at least 30 minutes.          History of Present Illness     Adult Annual Physical:  Patient presents for annual physical. Tremaine is a very pleasant 44-year-old male who is here today for his annual physical.  He denies any acute concerns or problems.  He is due for routine labs.  He continues to suffer from chronic back pain.  He is overdue for a routine eye exam and dental cleaning.  He would like a flu shot today.  He admits that he tries to watch his diet.  He is as active as he possibly can be.  He recently got a puppy, and this has been making him more active.  He admits that his breathing has been stable.  He still uses has albuterol as needed..     Diet and Physical Activity:  - Diet/Nutrition: well balanced diet.  - Exercise: no formal exercise.    Depression Screening:  - PHQ-2 Score: 0    General Health:  - Sleep: sleeps well.  - Hearing: normal hearing bilateral ears.  - Vision: most recent eye exam > 1 year ago.  - Dental: brushes teeth twice daily, does not floss and no dental visits for > 1 year.     Health:    - Urinary symptoms: none.     Review of Systems   Constitutional:  Negative for chills, diaphoresis, fatigue and fever.   HENT:  Negative for congestion, ear pain, postnasal drip, rhinorrhea, sneezing, sore throat and trouble swallowing.    Eyes:  Negative for pain and visual disturbance.   Respiratory:  Negative for apnea, cough, shortness of breath and wheezing.    Cardiovascular:  Negative for chest pain and palpitations.   Gastrointestinal:  Negative for abdominal pain, constipation, diarrhea, nausea and vomiting.   Genitourinary:  Negative for dysuria and hematuria.   Musculoskeletal:  Positive for arthralgias and back pain. Negative for gait problem and myalgias.   Neurological:  Negative for dizziness, syncope, weakness, light-headedness, numbness and headaches.   Psychiatric/Behavioral:  Negative for  "suicidal ideas. The patient is not nervous/anxious.          Objective   /62   Pulse 65   Ht 5' 5\" (1.651 m)   Wt 84.4 kg (186 lb)   SpO2 98%   BMI 30.95 kg/m²     Physical Exam  Vitals and nursing note reviewed.   Constitutional:       General: He is not in acute distress.     Appearance: He is well-developed.   HENT:      Head: Normocephalic and atraumatic.      Right Ear: Tympanic membrane, ear canal and external ear normal.      Left Ear: Tympanic membrane, ear canal and external ear normal.      Nose: Nose normal.      Mouth/Throat:      Mouth: Mucous membranes are moist.      Pharynx: No oropharyngeal exudate or posterior oropharyngeal erythema.   Eyes:      Extraocular Movements: Extraocular movements intact.      Conjunctiva/sclera: Conjunctivae normal.   Cardiovascular:      Rate and Rhythm: Normal rate and regular rhythm.      Heart sounds: No murmur heard.  Pulmonary:      Effort: Pulmonary effort is normal. No respiratory distress.      Breath sounds: Normal breath sounds. No wheezing, rhonchi or rales.   Abdominal:      Palpations: Abdomen is soft.      Tenderness: There is no abdominal tenderness. There is no guarding or rebound.   Musculoskeletal:         General: No swelling.      Cervical back: Neck supple.      Lumbar back: Tenderness present. Decreased range of motion.   Skin:     General: Skin is warm and dry.      Capillary Refill: Capillary refill takes less than 2 seconds.   Neurological:      Mental Status: He is alert.      Gait: Gait abnormal (Patient ambulates with a cane).   Psychiatric:         Mood and Affect: Mood normal.         "

## 2024-11-19 NOTE — ASSESSMENT & PLAN NOTE
Labs ordered to evaluate.  Recommended low-carb diet.  Orders:    Comprehensive metabolic panel; Future    Hemoglobin A1C; Future

## 2024-12-02 DIAGNOSIS — G43.109 MIGRAINE WITH AURA AND WITHOUT STATUS MIGRAINOSUS, NOT INTRACTABLE: ICD-10-CM

## 2024-12-02 DIAGNOSIS — E78.5 DYSLIPIDEMIA: ICD-10-CM

## 2024-12-02 RX ORDER — PROPRANOLOL HYDROCHLORIDE 40 MG/1
40 TABLET ORAL EVERY 12 HOURS SCHEDULED
Qty: 60 TABLET | Refills: 5 | Status: SHIPPED | OUTPATIENT
Start: 2024-12-02

## 2024-12-02 RX ORDER — ATORVASTATIN CALCIUM 20 MG/1
20 TABLET, FILM COATED ORAL DAILY
Qty: 30 TABLET | Refills: 5 | Status: SHIPPED | OUTPATIENT
Start: 2024-12-02

## 2024-12-02 NOTE — TELEPHONE ENCOUNTER
Reason for call:   [x] Refill   [] Prior Auth  [] Other:     Office:   [x] PCP/Provider - [facility]  [] Specialty/Provider -     Atorvastatin 20mg  1 tab daily #30    Propranolol 40mg  1 tab q12h #60    Ph[facility] [address], [address] [phone]     Does the patient have enough for 3 days?   [] Yes   [x] No - Send as HP to POD

## 2025-01-31 ENCOUNTER — APPOINTMENT (OUTPATIENT)
Dept: LAB | Facility: MEDICAL CENTER | Age: 45
End: 2025-01-31
Payer: COMMERCIAL

## 2025-01-31 DIAGNOSIS — E78.5 DYSLIPIDEMIA: ICD-10-CM

## 2025-01-31 DIAGNOSIS — R73.09 ELEVATED GLUCOSE: ICD-10-CM

## 2025-01-31 DIAGNOSIS — Z00.00 ANNUAL PHYSICAL EXAM: ICD-10-CM

## 2025-01-31 LAB
ALBUMIN SERPL BCG-MCNC: 4.4 G/DL (ref 3.5–5)
ALP SERPL-CCNC: 84 U/L (ref 34–104)
ALT SERPL W P-5'-P-CCNC: 35 U/L (ref 7–52)
ANION GAP SERPL CALCULATED.3IONS-SCNC: 7 MMOL/L (ref 4–13)
AST SERPL W P-5'-P-CCNC: 24 U/L (ref 13–39)
BASOPHILS # BLD AUTO: 0.07 THOUSANDS/ΜL (ref 0–0.1)
BASOPHILS NFR BLD AUTO: 1 % (ref 0–1)
BILIRUB SERPL-MCNC: 1.08 MG/DL (ref 0.2–1)
BUN SERPL-MCNC: 16 MG/DL (ref 5–25)
CALCIUM SERPL-MCNC: 9.5 MG/DL (ref 8.4–10.2)
CHLORIDE SERPL-SCNC: 102 MMOL/L (ref 96–108)
CHOLEST SERPL-MCNC: 148 MG/DL (ref ?–200)
CO2 SERPL-SCNC: 29 MMOL/L (ref 21–32)
CREAT SERPL-MCNC: 0.94 MG/DL (ref 0.6–1.3)
EOSINOPHIL # BLD AUTO: 0.33 THOUSAND/ΜL (ref 0–0.61)
EOSINOPHIL NFR BLD AUTO: 4 % (ref 0–6)
ERYTHROCYTE [DISTWIDTH] IN BLOOD BY AUTOMATED COUNT: 14 % (ref 11.6–15.1)
EST. AVERAGE GLUCOSE BLD GHB EST-MCNC: 137 MG/DL
GFR SERPL CREATININE-BSD FRML MDRD: 98 ML/MIN/1.73SQ M
GLUCOSE P FAST SERPL-MCNC: 101 MG/DL (ref 65–99)
HBA1C MFR BLD: 6.4 %
HCT VFR BLD AUTO: 48 % (ref 36.5–49.3)
HDLC SERPL-MCNC: 42 MG/DL
HGB BLD-MCNC: 15.5 G/DL (ref 12–17)
IMM GRANULOCYTES # BLD AUTO: 0.06 THOUSAND/UL (ref 0–0.2)
IMM GRANULOCYTES NFR BLD AUTO: 1 % (ref 0–2)
LDLC SERPL CALC-MCNC: 71 MG/DL (ref 0–100)
LYMPHOCYTES # BLD AUTO: 3.02 THOUSANDS/ΜL (ref 0.6–4.47)
LYMPHOCYTES NFR BLD AUTO: 34 % (ref 14–44)
MCH RBC QN AUTO: 27.1 PG (ref 26.8–34.3)
MCHC RBC AUTO-ENTMCNC: 32.3 G/DL (ref 31.4–37.4)
MCV RBC AUTO: 84 FL (ref 82–98)
MONOCYTES # BLD AUTO: 0.73 THOUSAND/ΜL (ref 0.17–1.22)
MONOCYTES NFR BLD AUTO: 8 % (ref 4–12)
NEUTROPHILS # BLD AUTO: 4.76 THOUSANDS/ΜL (ref 1.85–7.62)
NEUTS SEG NFR BLD AUTO: 52 % (ref 43–75)
NONHDLC SERPL-MCNC: 106 MG/DL
NRBC BLD AUTO-RTO: 0 /100 WBCS
PLATELET # BLD AUTO: 231 THOUSANDS/UL (ref 149–390)
PMV BLD AUTO: 10.5 FL (ref 8.9–12.7)
POTASSIUM SERPL-SCNC: 4.2 MMOL/L (ref 3.5–5.3)
PROT SERPL-MCNC: 7.2 G/DL (ref 6.4–8.4)
RBC # BLD AUTO: 5.71 MILLION/UL (ref 3.88–5.62)
SODIUM SERPL-SCNC: 138 MMOL/L (ref 135–147)
TRIGL SERPL-MCNC: 176 MG/DL (ref ?–150)
TSH SERPL DL<=0.05 MIU/L-ACNC: 1.6 UIU/ML (ref 0.45–4.5)
WBC # BLD AUTO: 8.97 THOUSAND/UL (ref 4.31–10.16)

## 2025-01-31 PROCEDURE — 80053 COMPREHEN METABOLIC PANEL: CPT

## 2025-01-31 PROCEDURE — 85025 COMPLETE CBC W/AUTO DIFF WBC: CPT

## 2025-01-31 PROCEDURE — 84443 ASSAY THYROID STIM HORMONE: CPT

## 2025-01-31 PROCEDURE — 36415 COLL VENOUS BLD VENIPUNCTURE: CPT

## 2025-01-31 PROCEDURE — 80061 LIPID PANEL: CPT

## 2025-01-31 PROCEDURE — 83036 HEMOGLOBIN GLYCOSYLATED A1C: CPT

## 2025-02-03 ENCOUNTER — RESULTS FOLLOW-UP (OUTPATIENT)
Dept: FAMILY MEDICINE CLINIC | Facility: CLINIC | Age: 45
End: 2025-02-03

## 2025-02-03 DIAGNOSIS — R73.09 ELEVATED GLUCOSE: Primary | ICD-10-CM

## 2025-05-06 DIAGNOSIS — K44.9 HIATAL HERNIA: ICD-10-CM

## 2025-05-06 DIAGNOSIS — K21.00 GASTROESOPHAGEAL REFLUX DISEASE WITH ESOPHAGITIS WITHOUT HEMORRHAGE: ICD-10-CM

## 2025-05-06 NOTE — TELEPHONE ENCOUNTER
Reason for call:   [x] Refill   [] Prior Auth  [] Other:     Office:   [x] PCP/Provider - [facility]      [] Specialty/Provider -     Medication: Prilosec    Dose/Frequency: 20 mg delayed release    Quantity: #30    Pharmacy: [facility]    [facility]   Does the patient have enough for 3 days?   [x] Yes   [] No - Send as HP to POD    [facility] Away Pharmacy   Does the patient have enough for 10 days?   [] Yes   [] No - Send as HP to POD

## 2025-05-08 RX ORDER — OMEPRAZOLE 20 MG/1
20 CAPSULE, DELAYED RELEASE ORAL
Qty: 30 CAPSULE | Refills: 5 | Status: SHIPPED | OUTPATIENT
Start: 2025-05-08

## 2025-05-13 ENCOUNTER — RA CDI HCC (OUTPATIENT)
Dept: OTHER | Facility: HOSPITAL | Age: 45
End: 2025-05-13

## 2025-05-13 NOTE — PROGRESS NOTES
HCC coding opportunities       Chart reviewed, no opportunity found: CHART REVIEWED, NO OPPORTUNITY FOUND        Patients Insurance        Commercial Insurance: [employer]

## 2025-05-20 ENCOUNTER — OFFICE VISIT (OUTPATIENT)
Dept: FAMILY MEDICINE CLINIC | Facility: CLINIC | Age: 45
End: 2025-05-20
Payer: COMMERCIAL

## 2025-05-20 VITALS
SYSTOLIC BLOOD PRESSURE: 122 MMHG | OXYGEN SATURATION: 95 % | BODY MASS INDEX: 31.52 KG/M2 | TEMPERATURE: 98.7 F | HEART RATE: 73 BPM | HEIGHT: 65 IN | DIASTOLIC BLOOD PRESSURE: 82 MMHG | WEIGHT: 189.2 LBS

## 2025-05-20 DIAGNOSIS — G43.109 MIGRAINE WITH AURA AND WITHOUT STATUS MIGRAINOSUS, NOT INTRACTABLE: ICD-10-CM

## 2025-05-20 DIAGNOSIS — M51.16 LUMBAR DISC HERNIATION WITH RADICULOPATHY: ICD-10-CM

## 2025-05-20 DIAGNOSIS — M54.16 LUMBAR RADICULOPATHY: ICD-10-CM

## 2025-05-20 DIAGNOSIS — K21.00 GASTROESOPHAGEAL REFLUX DISEASE WITH ESOPHAGITIS WITHOUT HEMORRHAGE: Primary | ICD-10-CM

## 2025-05-20 DIAGNOSIS — E78.5 DYSLIPIDEMIA: ICD-10-CM

## 2025-05-20 DIAGNOSIS — R73.09 ELEVATED GLUCOSE: ICD-10-CM

## 2025-05-20 PROCEDURE — 99214 OFFICE O/P EST MOD 30 MIN: CPT | Performed by: PHYSICIAN ASSISTANT

## 2025-05-20 RX ORDER — PROPRANOLOL HYDROCHLORIDE 60 MG/1
60 TABLET ORAL EVERY 12 HOURS SCHEDULED
Qty: 90 TABLET | Refills: 1 | Status: SHIPPED | OUTPATIENT
Start: 2025-05-20

## 2025-05-20 NOTE — ASSESSMENT & PLAN NOTE
Patient continues to have chronic pain.  He is not interested in seeing pain management at this time.

## 2025-05-20 NOTE — ASSESSMENT & PLAN NOTE
Migraines have increased in frequency.  Increase propranolol to 60 mg twice daily.  If no relief, will refer to neurology.  Orders:  •  propranolol (INDERAL) 60 mg tablet; Take 1 tablet (60 mg total) by mouth every 12 (twelve) hours

## 2025-05-20 NOTE — PROGRESS NOTES
Name: Tremaine Bravo      : 1980      MRN: [id number]  Encounter Provider: Beckie Dumont PA-C  Encounter Date: 2025   Encounter department: [facility]  :  Assessment & Plan  Gastroesophageal reflux disease with esophagitis without hemorrhage  Stable.  Continue omeprazole       Lumbar disc herniation with radiculopathy  Patient continues to have chronic pain.  He is not interested in seeing pain management at this time.       Lumbar radiculopathy  Patient continues to have chronic pain.  He is not interested in seeing pain management at this time.       Dyslipidemia  Recommended low-fat diet.  Continue Lipitor 20 mg daily         Elevated glucose  Recommended low-carb diet.  Encouraged patient to complete repeat labs.         Migraine with aura and without status migrainosus, not intractable  Migraines have increased in frequency.  Increase propranolol to 60 mg twice daily.  If no relief, will refer to neurology.  Orders:  •  propranolol (INDERAL) 60 mg tablet; Take 1 tablet (60 mg total) by mouth every 12 (twelve) hours           History of Present Illness   Tremaine is a pleasant 44-year-old male who is here today for routine follow-up.  He admits that his migraines have increased in frequency.  He will often wake up with migraines.  He denies any prodrome.  He has been taking over-the-counter migraine medication close for this does not provide much relief.  He continues to have chronic back pain.  He is not interested in seeing pain management at this time.  His other chronic conditions are all stable.  He does plan on completing his repeat labs.      Review of Systems   Constitutional:  Negative for chills, diaphoresis, fatigue and fever.   HENT:  Negative for congestion, ear pain, postnasal drip, rhinorrhea, sneezing, sore throat and trouble swallowing.    Eyes:  Negative for pain and visual disturbance.   Respiratory:  Negative for apnea, cough, shortness of breath and wheezing.   "  Cardiovascular:  Negative for chest pain and palpitations.   Gastrointestinal:  Negative for abdominal pain, constipation, diarrhea, nausea and vomiting.   Genitourinary:  Negative for dysuria and hematuria.   Musculoskeletal:  Positive for arthralgias and back pain. Negative for gait problem and myalgias.   Neurological:  Positive for headaches. Negative for dizziness, syncope, weakness, light-headedness and numbness.   Psychiatric/Behavioral:  Negative for suicidal ideas. The patient is not nervous/anxious.        Objective   /82   Pulse 73   Temp 98.7 °F (37.1 °C)   Ht 5' 5\" (1.651 m)   Wt 85.8 kg (189 lb 3.2 oz)   SpO2 95%   BMI 31.48 kg/m²      Physical Exam  Vitals and nursing note reviewed.   Constitutional:       General: He is not in acute distress.     Appearance: He is well-developed.   HENT:      Head: Normocephalic and atraumatic.      Right Ear: External ear normal.      Left Ear: External ear normal.      Nose: Nose normal.      Mouth/Throat:      Mouth: Mucous membranes are moist.      Pharynx: No oropharyngeal exudate or posterior oropharyngeal erythema.     Eyes:      Extraocular Movements: Extraocular movements intact.      Conjunctiva/sclera: Conjunctivae normal.       Cardiovascular:      Rate and Rhythm: Normal rate and regular rhythm.      Heart sounds: Normal heart sounds. No murmur heard.     No friction rub. No gallop.   Pulmonary:      Effort: Pulmonary effort is normal. No respiratory distress.      Breath sounds: Normal breath sounds. No wheezing, rhonchi or rales.   Abdominal:      General: Bowel sounds are normal.     Musculoskeletal:         General: Normal range of motion.      Cervical back: Normal range of motion and neck supple.      Lumbar back: Tenderness present.     Skin:     General: Skin is warm and dry.      Capillary Refill: Capillary refill takes less than 2 seconds.     Neurological:      Mental Status: He is alert and oriented to person, place, and time. "      Gait: Gait abnormal (Ambulates with a cane).     Psychiatric:         Mood and Affect: Mood normal.         Behavior: Behavior normal.         Thought Content: Thought content normal.         Judgment: Judgment normal.

## 2025-06-03 DIAGNOSIS — E78.5 DYSLIPIDEMIA: ICD-10-CM

## 2025-06-03 RX ORDER — ATORVASTATIN CALCIUM 20 MG/1
20 TABLET, FILM COATED ORAL DAILY
Qty: 30 TABLET | Refills: 5 | Status: SHIPPED | OUTPATIENT
Start: 2025-06-03

## 2025-06-03 NOTE — TELEPHONE ENCOUNTER
Reason for call:   [x] Refill   [] Prior Auth  [] Other:     Office:   [x] PCP/Provider - [facility]   [] Specialty/Provider -     Medication: atorvastatin (LIPITOR) 20 mg tablet     Dose/Frequency: 20 mg daily    Quantity: 30    Pharmacy: [id number]    Local Pharmacy   Does the patient have enough for 3 days?   [] Yes   [x] No - Send as HP to POD    Mail Away Pharmacy   Does the patient have enough for 10 days?   [] Yes   [] No - Send as HP to POD

## 2025-06-10 ENCOUNTER — APPOINTMENT (OUTPATIENT)
Dept: LAB | Facility: MEDICAL CENTER | Age: 45
End: 2025-06-10
Attending: PHYSICIAN ASSISTANT
Payer: COMMERCIAL

## 2025-06-10 DIAGNOSIS — R73.09 ELEVATED GLUCOSE: ICD-10-CM

## 2025-06-10 LAB
ALBUMIN SERPL BCG-MCNC: 4.4 G/DL (ref 3.5–5)
ALP SERPL-CCNC: 97 U/L (ref 34–104)
ALT SERPL W P-5'-P-CCNC: 32 U/L (ref 7–52)
ANION GAP SERPL CALCULATED.3IONS-SCNC: 9 MMOL/L (ref 4–13)
AST SERPL W P-5'-P-CCNC: 24 U/L (ref 13–39)
BILIRUB SERPL-MCNC: 0.71 MG/DL (ref 0.2–1)
BUN SERPL-MCNC: 14 MG/DL (ref 5–25)
CALCIUM SERPL-MCNC: 9.1 MG/DL (ref 8.4–10.2)
CHLORIDE SERPL-SCNC: 103 MMOL/L (ref 96–108)
CO2 SERPL-SCNC: 27 MMOL/L (ref 21–32)
CREAT SERPL-MCNC: 0.93 MG/DL (ref 0.6–1.3)
EST. AVERAGE GLUCOSE BLD GHB EST-MCNC: 137 MG/DL
GFR SERPL CREATININE-BSD FRML MDRD: 100 ML/MIN/1.73SQ M
GLUCOSE P FAST SERPL-MCNC: 114 MG/DL (ref 65–99)
HBA1C MFR BLD: 6.4 %
POTASSIUM SERPL-SCNC: 4.2 MMOL/L (ref 3.5–5.3)
PROT SERPL-MCNC: 7 G/DL (ref 6.4–8.4)
SODIUM SERPL-SCNC: 139 MMOL/L (ref 135–147)

## 2025-06-10 PROCEDURE — 80053 COMPREHEN METABOLIC PANEL: CPT

## 2025-06-10 PROCEDURE — 83036 HEMOGLOBIN GLYCOSYLATED A1C: CPT

## 2025-06-10 PROCEDURE — 36415 COLL VENOUS BLD VENIPUNCTURE: CPT

## 2025-06-11 ENCOUNTER — RESULTS FOLLOW-UP (OUTPATIENT)
Dept: FAMILY MEDICINE CLINIC | Facility: CLINIC | Age: 45
End: 2025-06-11